# Patient Record
Sex: MALE | URBAN - METROPOLITAN AREA
[De-identification: names, ages, dates, MRNs, and addresses within clinical notes are randomized per-mention and may not be internally consistent; named-entity substitution may affect disease eponyms.]

---

## 2018-09-03 ENCOUNTER — INPATIENT (INPATIENT)
Facility: HOSPITAL | Age: 73
LOS: 0 days | Discharge: ANOTHER HOSPITAL | DRG: 282 | End: 2018-09-04
Attending: INTERNAL MEDICINE | Admitting: INTERNAL MEDICINE
Payer: MEDICARE

## 2018-09-03 ENCOUNTER — TRANSCRIPTION ENCOUNTER (OUTPATIENT)
Age: 73
End: 2018-09-03

## 2018-09-03 VITALS
OXYGEN SATURATION: 100 % | HEART RATE: 62 BPM | DIASTOLIC BLOOD PRESSURE: 76 MMHG | HEIGHT: 64 IN | RESPIRATION RATE: 17 BRPM | WEIGHT: 162.92 LBS | TEMPERATURE: 98 F | SYSTOLIC BLOOD PRESSURE: 139 MMHG

## 2018-09-03 DIAGNOSIS — R07.9 CHEST PAIN, UNSPECIFIED: ICD-10-CM

## 2018-09-03 DIAGNOSIS — Z29.9 ENCOUNTER FOR PROPHYLACTIC MEASURES, UNSPECIFIED: ICD-10-CM

## 2018-09-03 DIAGNOSIS — I21.4 NON-ST ELEVATION (NSTEMI) MYOCARDIAL INFARCTION: ICD-10-CM

## 2018-09-03 DIAGNOSIS — E78.5 HYPERLIPIDEMIA, UNSPECIFIED: ICD-10-CM

## 2018-09-03 DIAGNOSIS — I10 ESSENTIAL (PRIMARY) HYPERTENSION: ICD-10-CM

## 2018-09-03 LAB
ANION GAP SERPL CALC-SCNC: 8 MMOL/L — SIGNIFICANT CHANGE UP (ref 5–17)
APTT BLD: 31.6 SEC — SIGNIFICANT CHANGE UP (ref 27.5–37.4)
BASOPHILS # BLD AUTO: 0 K/UL — SIGNIFICANT CHANGE UP (ref 0–0.2)
BASOPHILS NFR BLD AUTO: 0.7 % — SIGNIFICANT CHANGE UP (ref 0–2)
BUN SERPL-MCNC: 23 MG/DL — HIGH (ref 7–18)
CALCIUM SERPL-MCNC: 8.6 MG/DL — SIGNIFICANT CHANGE UP (ref 8.4–10.5)
CHLORIDE SERPL-SCNC: 108 MMOL/L — SIGNIFICANT CHANGE UP (ref 96–108)
CK MB BLD-MCNC: 6.3 % — HIGH (ref 0–3.5)
CK MB CFR SERPL CALC: 44.4 NG/ML — HIGH (ref 0–3.6)
CK SERPL-CCNC: 701 U/L — HIGH (ref 35–232)
CO2 SERPL-SCNC: 25 MMOL/L — SIGNIFICANT CHANGE UP (ref 22–31)
CREAT SERPL-MCNC: 1.04 MG/DL — SIGNIFICANT CHANGE UP (ref 0.5–1.3)
EOSINOPHIL # BLD AUTO: 0.1 K/UL — SIGNIFICANT CHANGE UP (ref 0–0.5)
EOSINOPHIL NFR BLD AUTO: 1.8 % — SIGNIFICANT CHANGE UP (ref 0–6)
GLUCOSE SERPL-MCNC: 120 MG/DL — HIGH (ref 70–99)
HBA1C BLD-MCNC: 5.8 % — HIGH (ref 4–5.6)
HCT VFR BLD CALC: 51.5 % — HIGH (ref 39–50)
HGB BLD-MCNC: 16.4 G/DL — SIGNIFICANT CHANGE UP (ref 13–17)
INR BLD: 1.06 RATIO — SIGNIFICANT CHANGE UP (ref 0.88–1.16)
LYMPHOCYTES # BLD AUTO: 1.5 K/UL — SIGNIFICANT CHANGE UP (ref 1–3.3)
LYMPHOCYTES # BLD AUTO: 25.8 % — SIGNIFICANT CHANGE UP (ref 13–44)
MCHC RBC-ENTMCNC: 30.6 PG — SIGNIFICANT CHANGE UP (ref 27–34)
MCHC RBC-ENTMCNC: 31.9 GM/DL — LOW (ref 32–36)
MCV RBC AUTO: 95.9 FL — SIGNIFICANT CHANGE UP (ref 80–100)
MONOCYTES # BLD AUTO: 0.5 K/UL — SIGNIFICANT CHANGE UP (ref 0–0.9)
MONOCYTES NFR BLD AUTO: 9.1 % — SIGNIFICANT CHANGE UP (ref 2–14)
NEUTROPHILS # BLD AUTO: 3.7 K/UL — SIGNIFICANT CHANGE UP (ref 1.8–7.4)
NEUTROPHILS NFR BLD AUTO: 62.7 % — SIGNIFICANT CHANGE UP (ref 43–77)
PLATELET # BLD AUTO: 166 K/UL — SIGNIFICANT CHANGE UP (ref 150–400)
POTASSIUM SERPL-MCNC: 4.7 MMOL/L — SIGNIFICANT CHANGE UP (ref 3.5–5.3)
POTASSIUM SERPL-SCNC: 4.7 MMOL/L — SIGNIFICANT CHANGE UP (ref 3.5–5.3)
PROTHROM AB SERPL-ACNC: 11.6 SEC — SIGNIFICANT CHANGE UP (ref 9.8–12.7)
RBC # BLD: 5.37 M/UL — SIGNIFICANT CHANGE UP (ref 4.2–5.8)
RBC # FLD: 12.9 % — SIGNIFICANT CHANGE UP (ref 10.3–14.5)
SODIUM SERPL-SCNC: 141 MMOL/L — SIGNIFICANT CHANGE UP (ref 135–145)
TROPONIN I SERPL-MCNC: 11.4 NG/ML — HIGH (ref 0–0.04)
TROPONIN I SERPL-MCNC: <0.015 NG/ML — SIGNIFICANT CHANGE UP (ref 0–0.04)
TSH SERPL-MCNC: 0.77 UU/ML — SIGNIFICANT CHANGE UP (ref 0.34–4.82)
WBC # BLD: 6 K/UL — SIGNIFICANT CHANGE UP (ref 3.8–10.5)
WBC # FLD AUTO: 6 K/UL — SIGNIFICANT CHANGE UP (ref 3.8–10.5)

## 2018-09-03 PROCEDURE — 99285 EMERGENCY DEPT VISIT HI MDM: CPT

## 2018-09-03 PROCEDURE — 99223 1ST HOSP IP/OBS HIGH 75: CPT | Mod: GC

## 2018-09-03 PROCEDURE — 71046 X-RAY EXAM CHEST 2 VIEWS: CPT | Mod: 26

## 2018-09-03 RX ORDER — ENOXAPARIN SODIUM 100 MG/ML
40 INJECTION SUBCUTANEOUS DAILY
Refills: 0 | Status: DISCONTINUED | OUTPATIENT
Start: 2018-09-03 | End: 2018-09-03

## 2018-09-03 RX ORDER — ACETAMINOPHEN 500 MG
650 TABLET ORAL EVERY 6 HOURS
Refills: 0 | Status: DISCONTINUED | OUTPATIENT
Start: 2018-09-03 | End: 2018-09-04

## 2018-09-03 RX ORDER — METOPROLOL TARTRATE 50 MG
0.5 TABLET ORAL
Qty: 0 | Refills: 0 | DISCHARGE
Start: 2018-09-03

## 2018-09-03 RX ORDER — ASPIRIN/CALCIUM CARB/MAGNESIUM 324 MG
1 TABLET ORAL
Qty: 0 | Refills: 0 | DISCHARGE
Start: 2018-09-03

## 2018-09-03 RX ORDER — ACETAMINOPHEN 500 MG
2 TABLET ORAL
Qty: 0 | Refills: 0 | DISCHARGE
Start: 2018-09-03

## 2018-09-03 RX ORDER — ATORVASTATIN CALCIUM 80 MG/1
40 TABLET, FILM COATED ORAL AT BEDTIME
Refills: 0 | Status: DISCONTINUED | OUTPATIENT
Start: 2018-09-03 | End: 2018-09-03

## 2018-09-03 RX ORDER — METOPROLOL TARTRATE 50 MG
12.5 TABLET ORAL
Refills: 0 | Status: DISCONTINUED | OUTPATIENT
Start: 2018-09-03 | End: 2018-09-04

## 2018-09-03 RX ORDER — METOPROLOL TARTRATE 50 MG
25 TABLET ORAL
Refills: 0 | Status: DISCONTINUED | OUTPATIENT
Start: 2018-09-03 | End: 2018-09-03

## 2018-09-03 RX ORDER — METOPROLOL TARTRATE 50 MG
0 TABLET ORAL
Qty: 0 | Refills: 0 | DISCHARGE
Start: 2018-09-03

## 2018-09-03 RX ORDER — ASPIRIN/CALCIUM CARB/MAGNESIUM 324 MG
81 TABLET ORAL DAILY
Refills: 0 | Status: DISCONTINUED | OUTPATIENT
Start: 2018-09-03 | End: 2018-09-04

## 2018-09-03 RX ORDER — HEPARIN SODIUM 5000 [USP'U]/ML
6000 INJECTION INTRAVENOUS; SUBCUTANEOUS EVERY 6 HOURS
Refills: 0 | Status: DISCONTINUED | OUTPATIENT
Start: 2018-09-03 | End: 2018-09-03

## 2018-09-03 RX ORDER — ATORVASTATIN CALCIUM 80 MG/1
1 TABLET, FILM COATED ORAL
Qty: 0 | Refills: 0 | DISCHARGE
Start: 2018-09-03

## 2018-09-03 RX ORDER — HEPARIN SODIUM 5000 [USP'U]/ML
3000 INJECTION INTRAVENOUS; SUBCUTANEOUS EVERY 6 HOURS
Refills: 0 | Status: DISCONTINUED | OUTPATIENT
Start: 2018-09-03 | End: 2018-09-03

## 2018-09-03 RX ORDER — ASPIRIN/CALCIUM CARB/MAGNESIUM 324 MG
243 TABLET ORAL DAILY
Refills: 0 | Status: COMPLETED | OUTPATIENT
Start: 2018-09-03 | End: 2018-09-03

## 2018-09-03 RX ORDER — HEPARIN SODIUM 5000 [USP'U]/ML
INJECTION INTRAVENOUS; SUBCUTANEOUS
Qty: 25000 | Refills: 0 | Status: DISCONTINUED | OUTPATIENT
Start: 2018-09-03 | End: 2018-09-04

## 2018-09-03 RX ORDER — ASPIRIN/CALCIUM CARB/MAGNESIUM 324 MG
243 TABLET ORAL ONCE
Refills: 0 | Status: DISCONTINUED | OUTPATIENT
Start: 2018-09-03 | End: 2018-09-03

## 2018-09-03 RX ORDER — HEPARIN SODIUM 5000 [USP'U]/ML
0 INJECTION INTRAVENOUS; SUBCUTANEOUS
Qty: 0 | Refills: 0 | DISCHARGE
Start: 2018-09-03

## 2018-09-03 RX ORDER — SODIUM CHLORIDE 9 MG/ML
1000 INJECTION INTRAMUSCULAR; INTRAVENOUS; SUBCUTANEOUS ONCE
Refills: 0 | Status: COMPLETED | OUTPATIENT
Start: 2018-09-03 | End: 2018-09-03

## 2018-09-03 RX ORDER — ATORVASTATIN CALCIUM 80 MG/1
80 TABLET, FILM COATED ORAL AT BEDTIME
Refills: 0 | Status: DISCONTINUED | OUTPATIENT
Start: 2018-09-03 | End: 2018-09-04

## 2018-09-03 RX ADMIN — Medication 243 MILLIGRAM(S): at 10:51

## 2018-09-03 RX ADMIN — HEPARIN SODIUM 1300 UNIT(S)/HR: 5000 INJECTION INTRAVENOUS; SUBCUTANEOUS at 20:22

## 2018-09-03 RX ADMIN — SODIUM CHLORIDE 1000 MILLILITER(S): 9 INJECTION INTRAMUSCULAR; INTRAVENOUS; SUBCUTANEOUS at 10:50

## 2018-09-03 RX ADMIN — Medication 12.5 MILLIGRAM(S): at 19:59

## 2018-09-03 RX ADMIN — ATORVASTATIN CALCIUM 80 MILLIGRAM(S): 80 TABLET, FILM COATED ORAL at 22:42

## 2018-09-03 RX ADMIN — Medication 25 MILLIGRAM(S): at 17:34

## 2018-09-03 NOTE — ED PROVIDER NOTE - PROGRESS NOTE DETAILS
Patient admitted to hospitalist service. Patient's pain remains present however it has lessened. Patient's breathing and diaphoresis has improved. Initial cardiac workup negative. Will recommend admission.

## 2018-09-03 NOTE — H&P ADULT - ATTENDING COMMENTS
Patient seen and examined; Agree with PGY1 MAR A/P above with editing as needed. d/w LORI White    72 yr old male form home with pmh of htn & hld who presented in emergency with chest pain x 3 hours. Pt states that his chest pain started suddenly while he was sitting, 6/10 in intensity, dull, radiating to his left arm and back on left side.    ROS:   FH: No significant Hx DM, HTN, CAD  SH: Non smoker; I glass wine daily; Lives with wife    Vital Signs Last 24 Hrs  T(C): 36.8 (03 Sep 2018 16:09), Max: 36.9 (03 Sep 2018 10:13)  T(F): 98.2 (03 Sep 2018 16:09), Max: 98.5 (03 Sep 2018 10:13)  HR: 74 (03 Sep 2018 16:09) (62 - 74)  BP: 128/77 (03 Sep 2018 16:09) (128/77 - 139/76)  RR: 18 (03 Sep 2018 16:09) (17 - 18)  SpO2: 99% (03 Sep 2018 16:09) (99% - 100%)    P/E:  Neuro: AAO x 3; No focal deficits  CVS: S1S2 present, Regular  Resp: BLAE+, No wheeze or Rhonchi  GI: Soft, BS+, NT, ND  Extr; No edema or calf tenderness B/L LE; Good ROM at left shoulder    Labs:                        16.4   6.0   )-----------( 166      ( 03 Sep 2018 10:45 )             51.5   09-03    141  |  108  |  23<H>  ----------------------------<  120<H>  4.7   |  25  |  1.04    Ca    8.6      03 Sep 2018 10:45    CE x1 set negative    Plan:  Tele x 24 hrs; Serial Cardiac enzymes x3; 2D Echo  ASA, Statin, low dose beta blocker until ACS ruled out  Cardio eval d/w Dr. Guadarrama; will get Nuclear Exercise Stress in AM if Echo WNL  Patient and wife advised no coffee, tea or Soda from now until morning  DVT PPX  Continue home meds      Discussed with Patient and wife findings and plan of care as outlined above  Discussed with RN ED Hold and PGY1 Dr. Bran Patient seen and examined; Agree with PGY1 MAR A/P above with editing as needed. d/w LORI White    72 yr old male form home with pmh of htn & hld who presented in emergency with chest pain x 3 hours. Pt states that his chest pain started suddenly while he was sitting, 6/10 in intensity, dull, radiating to his left arm and back on left side.    ROS:   FH: No significant Hx DM, HTN, CAD  SH: Non smoker; I glass wine daily; Lives with wife    Vital Signs Last 24 Hrs  T(C): 36.8 (03 Sep 2018 16:09), Max: 36.9 (03 Sep 2018 10:13)  T(F): 98.2 (03 Sep 2018 16:09), Max: 98.5 (03 Sep 2018 10:13)  HR: 74 (03 Sep 2018 16:09) (62 - 74)  BP: 128/77 (03 Sep 2018 16:09) (128/77 - 139/76)  RR: 18 (03 Sep 2018 16:09) (17 - 18)  SpO2: 99% (03 Sep 2018 16:09) (99% - 100%)    P/E:  Neuro: AAO x 3; No focal deficits  CVS: S1S2 present, Regular  Resp: BLAE+, No wheeze or Rhonchi  GI: Soft, BS+, NT, ND  Extr; No edema or calf tenderness B/L LE; Good ROM at left shoulder    Labs:                        16.4   6.0   )-----------( 166      ( 03 Sep 2018 10:45 )             51.5   09-03    141  |  108  |  23<H>  ----------------------------<  120<H>  4.7   |  25  |  1.04    Ca    8.6      03 Sep 2018 10:45    CE x1 set negative    Plan:  Tele x 24 hrs; Serial Cardiac enzymes x3; 2D Echo  ASA, Statin, low dose beta blocker until ACS ruled out  Cardio eval d/w Dr. Guadarrama; will get Nuclear Exercise Stress in AM if Echo WNL  Patient and wife advised no coffee, tea or Soda from now until morning  DVT PPX  Continue home meds  Add on TSH and HgbA1c      Discussed with Patient and wife findings and plan of care as outlined above  Discussed with RN ED Hold and PGY1 Dr. Bran Patient seen and examined; Agree with PGY1 MAR A/P above with editing as needed. d/w LORI White    72 yr old male form home with pmh of htn & hld who presented in emergency with chest pain x 3 hours. Pt states that his chest pain started suddenly while he was sitting, 6/10 in intensity, dull, radiating to his left arm and back on left side.    ROS:   FH: No significant Hx DM, HTN, CAD  SH: Non smoker; I glass wine daily; Lives with wife    Vital Signs Last 24 Hrs  T(C): 36.8 (03 Sep 2018 16:09), Max: 36.9 (03 Sep 2018 10:13)  T(F): 98.2 (03 Sep 2018 16:09), Max: 98.5 (03 Sep 2018 10:13)  HR: 74 (03 Sep 2018 16:09) (62 - 74)  BP: 128/77 (03 Sep 2018 16:09) (128/77 - 139/76)  RR: 18 (03 Sep 2018 16:09) (17 - 18)  SpO2: 99% (03 Sep 2018 16:09) (99% - 100%)    P/E:  Neuro: AAO x 3; No focal deficits  CVS: S1S2 present, Regular  Resp: BLAE+, No wheeze or Rhonchi  GI: Soft, BS+, NT, ND  Extr; No edema or calf tenderness B/L LE; Good ROM at left shoulder    Labs:                        16.4   6.0   )-----------( 166      ( 03 Sep 2018 10:45 )             51.5   09-03    141  |  108  |  23<H>  ----------------------------<  120<H>  4.7   |  25  |  1.04    Ca    8.6      03 Sep 2018 10:45    CE x1 set negative    Plan:  Tele x 24 hrs; Serial Cardiac enzymes x3; 2D Echo  ASA, Statin, low dose beta blocker until ACS ruled out  Cardio eval d/w Dr. Guadarrama; will get Nuclear Exercise Stress in AM if Echo WNL  Patient and wife advised no coffee, tea or Soda from now until morning  DVT PPX  Continue home meds  Add on TSH and HgbA1c      Discussed with Patient and wife findings and plan of care as outlined above  Discussed with RN ED Hold and PGY1 Dr. White

## 2018-09-03 NOTE — ED PROVIDER NOTE - MEDICAL DECISION MAKING DETAILS
Patient with chest pain for x1 hr duration. Will obtain EKG, cardiac workup, Aspirin, fluids, chest X-ray and reassess.

## 2018-09-03 NOTE — DISCHARGE NOTE ADULT - CARE PROVIDER_API CALL
Erasmo Guadarrama), Cardiology  [address] [id number]  Phone: [phone]  Fax: [phone] Erasmo Guadarrama), Cardiology  [address] [place] [address]  Phone: [phone]  Fax: [phone]    Boy Astudillo), Cardiovascular Disease; Interventional Cardiology  [address] [place] [address]  Phone: [phone]  Fax: [phone]

## 2018-09-03 NOTE — H&P ADULT - PROBLEM SELECTOR PLAN 1
Atypical Chest pain with No associated diaphoresis, palpitations, sob or syncope  Cardiac enzyme set 1 negative  Cardiac enzyme set 2 positive at 11 & EKG done with 2nd set of Trop show new T wave inversion in lead 3   -NSTEMI  -aspirin, statin & beta blocker  - consulted, pt started on heparin drip   -T3 at 23:59 on 09/03/18  -Pt will be transferred to [place] with receiving physician Dr. Astudillo for cardiac cath  -Plan discussed with Dr. Guadarrama

## 2018-09-03 NOTE — H&P ADULT - PROBLEM SELECTOR PLAN 4
will continue with Atorvastatin Improve score of 2, pt on full dose heparin drip so no need for lovenox

## 2018-09-03 NOTE — DISCHARGE NOTE ADULT - MEDICATION SUMMARY - MEDICATIONS TO TAKE
I will START or STAY ON the medications listed below when I get home from the hospital:    acetaminophen 325 mg oral tablet  -- 2 tab(s) by mouth every 6 hours, As needed, Moderate Pain (4 - 6)  -- Indication: For pain    aspirin 81 mg oral tablet, chewable  -- 1 tab(s) by mouth once a day  -- Indication: For prophylactic    enalapril 10 mg oral tablet  -- 1 tab(s) by mouth once a day  -- Indication: For HTN (hypertension)    heparin 100 units/mL-D5% intravenous solution  --  intravenous   -- Indication: For NSTEMI (non-ST elevated myocardial infarction)    atorvastatin 80 mg oral tablet  -- 1 tab(s) by mouth once a day (at bedtime)  -- Indication: For NSTEMI (non-ST elevated myocardial infarction)    lovastatin 20 mg oral tablet  -- 1 tab(s) by mouth once a day (at bedtime)  -- Indication: For Hyperlipidemia    metoprolol  -- Indication: For HTN (hypertension)

## 2018-09-03 NOTE — PATIENT PROFILE ADULT. - VISION (WITH CORRECTIVE LENSES IF THE PATIENT USUALLY WEARS THEM):
uses glasses for reading/Normal vision: sees adequately in most situations; can see medication labels, newsprint

## 2018-09-03 NOTE — ED PROVIDER NOTE - OBJECTIVE STATEMENT
73 y/o M patient with # [id number]  73 y/o M patient with a significant PMHx of HTN, HLD and no significant PSHx presents to the ED with a x1 hour history left chest pain which radiates to the left arm. Patient also reports chest pain is associated with nausea, diaphoresis, and shortness of breath. Patient denies emesis, abdominal pain, change in bladder or bowel habits, syncope, light headiness or any other complaints. Patient states she noticed some left lower extremity swelling. Patient denies fall, history of cardiac illness and similar events. NKDA.

## 2018-09-03 NOTE — ED ADULT NURSE NOTE - ED STAT RN HANDOFF DETAILS 2
Pt is transferred to [place] and was picked up by ambulance. pt is stable, vitals WNL. No chest pains, heparin drip @ 900 units/hr in progress.

## 2018-09-03 NOTE — DISCHARGE NOTE ADULT - MEDICATION SUMMARY - MEDICATIONS TO STOP TAKING
I will STOP taking the medications listed below when I get home from the hospital:    Aspir 81    lovastatin 20 mg oral tablet  -- 1 tab(s) by mouth once a day (at bedtime)

## 2018-09-03 NOTE — H&P ADULT - HISTORY OF PRESENT ILLNESS
Pt is a 72 yr old male form home with pmh of htn & hld who presented in emergency with chest pain x 3 hours. Pt states that his chest pain started suddenly while he was sitting, 6/10 in intensity, dull, radiating to his left arm and back on left side. Pt denied any associated palpitations sob, diaphoresis, dizziness associated with chest pain. Pt denied any previous episode of similar pain. Pt is able to ambulate with any shortness of breath at baseline Pt is a 72 yr old male form home with pmh of htn & hld who presented in emergency with chest pain x 3 hours. Pt states that his chest pain started suddenly while he was sitting, 6/10 in intensity, dull, radiating to his left arm and back on left side. Pt denied any associated palpitations sob, diaphoresis, dizziness associated with chest pain. Pt denied any recent exertional work or lifting heavy weights. Pt denied any previous episode of similar pain. Pt is able to ambulate with any shortness of breath at baseline. Pt denies any history of smoking or drug intake, but does aknowlegde alcohol intake (white wine) 1 glass per day.

## 2018-09-03 NOTE — ED ADULT NURSE REASSESSMENT NOTE - NS ED NURSE REASSESS COMMENT FT1
received pt from nurse stephanie butler pt awake alert oriented x 3 not in dsitress,with saline lock intact no redness no swelling noted,hooked to cardiac monitor on heparin drip.

## 2018-09-03 NOTE — DISCHARGE NOTE ADULT - PLAN OF CARE
will continue with enalapril & metoprolol with parameters will continue with atorvastatin 80mg T1 0.015  T2 11.4  T3   EKG 1 NSR @ 70, no st segment & t wave changes & RBBB, EKG with trop 2 show t wave inversion in lead 3 & RBBB  -Transferring Pt for cardiac catherization to [place] with receiving physician Dr. Astudillo Cardiac Catherization Patient presented with left sided chest pain with radiation to left shoulder and arm. Initial troponin was negative but marissa to 11 on repeat after 6 hrs. No significant EKG changes on repeat.   Patient on Aspirin; As d/w Cardio placed on heparin drip and given high dose Statin Lipitor 80 mg. Also continued on beta blocker and ARB.   EKG 1 NSR @ 70, no st segment & t wave changes & RBBB, EKG with trop 2 show t wave inversion in lead 3 & RBBB  -Transferring Patient for Cardiac Catheterization to [place] with receiving physician Dr. Astudillo Evaluate for Coronary obstruction and treat Control BP less than 130/80 mm Hg Placed on with atorvastatin 80mg

## 2018-09-03 NOTE — CONSULT NOTE ADULT - SUBJECTIVE AND OBJECTIVE BOX
CHIEF COMPLAINT:Chest Pain    HPI:- 72 yr old male from home with pmh of HTN,HLD who presented in emergency with chest pain x 3 hours. Pt states that his chest pain started suddenly while he was sitting, 6/10 in intensity, dull, radiating to his left arm and back on left side. Pt denied any associated palpitations sob, diaphoresis, dizziness associated with chest pain. Pt denied any recent exertional work or lifting heavy weights. Pt denied any previous episode of similar pain. Pt is able to ambulate with any shortness of breath at baseline. Pt denies any history of smoking or drug intake, but does aknowlegde alcohol intake (white wine) 1 glass per day. Noted elevated troponins-remains chest pain free with no ECG evidence for STEMI.    PAST MEDICAL & SURGICAL HISTORY:  Hyperlipidemia  HTN (hypertension)  No significant past surgical history      MEDICATIONS  (STANDING):  aspirin  chewable 81 milliGRAM(s) Oral daily  atorvastatin 80 milliGRAM(s) Oral at bedtime  enalapril 10 milliGRAM(s) Oral daily  heparin  Infusion.  Unit(s)/Hr (13 mL/Hr) IV Continuous <Continuous>  metoprolol tartrate 12.5 milliGRAM(s) Oral two times a day    MEDICATIONS  (PRN):  acetaminophen   Tablet. 650 milliGRAM(s) Oral every 6 hours PRN Moderate Pain (4 - 6)      FAMILY HISTORY:  No pertinent family history in first degree relatives  No family history of premature coronary artery disease or sudden cardiac death    SOCIAL HISTORY:  Smoking-Non Smoker  Alcohol-Social  Ilicit Drug use-Denies    REVIEW OF SYSTEMS:  Constitutional: [ ] fever, [ ]weight loss, [ ]fatigue Activity [ ] Bedbound,[ ] Ambulates [ ] Unassisted[ ] Cane/Walker [ ] Assistence.  Eyes: [ ] visual changes  Respiratory: [ ]shortness of breath;  [ ] cough, [ ]wheezing, [ ]chills, [ ]hemoptysis  Cardiovascular: [x ] chest pain, [ ]palpitations, [ ]dizziness,  [ ]leg swelling[ ]orthopnea [ ]PND  Gastrointestinal: [ ] abdominal pain, [ ]nausea, [ ]vomiting,  [ ]diarrhea,[ ]constipation  Genitourinary: [ ] dysuria, [ ] hematuria  Neurologic: [ ] headaches [ ] tremors[ ] weakness  Skin: [ ] itching, [ ]burning, [ ] rashes  Endocrine: [ ] heat or cold intolerance  Musculoskeletal: [ ] joint pain or swelling; [ ] muscle, back, or extremity pain  Psychiatric: [ ] depression, [ ]anxiety, [ ]mood swings, or [ ]difficulty sleeping  Hematologic: [ ] easy bruising, [ ] bleeding gums       [ x] All others negative	  [ ] Unable to obtain    Vital Signs Last 24 Hrs  T(C): 36.8 (03 Sep 2018 16:09), Max: 36.9 (03 Sep 2018 10:13)  T(F): 98.2 (03 Sep 2018 16:09), Max: 98.5 (03 Sep 2018 10:13)  HR: 74 (03 Sep 2018 16:09) (62 - 74)  BP: 128/77 (03 Sep 2018 16:09) (128/77 - 139/76)  RR: 18 (03 Sep 2018 16:09) (17 - 18)  SpO2: 99% (03 Sep 2018 16:09) (99% - 100%)    PHYSICAL EXAM:  General: No acute distress BMI-28  HEENT: EOMI, PERRL[ ] Icteric  Neck: Supple, No JVD  Lungs: Equal air entry bilaterally; [ ] Rales [ ] Rhonchi [ ] Wheezing  Heart: Regular rate and rhythm;[x ] Murmurs-  2 /6 [x ] Systolic [ ] Diastolic [ ] Radiation,No rubs, or gallops  Abdomen: Nontender, bowel sounds present  Extremities: No clubbing, cyanosis, or edema[ ] Calf tenderness  Nervous system:  Alert & Oriented X3, no focal deficits  Psychiatric: Normal affect  Skin: No rashes or lesions      LABS:             16.4   6.0   )-----------( 166      ( 03 Sep 2018 10:45 )             51.5   09-03    141  |  108  |  23<H>  ----------------------------<  120<H>  4.7   |  25  |  1.04    CARDIAC MARKERS ( 03 Sep 2018 17:55 )  11.400 ng/mL / x     / 701 U/L / x     / 44.4 ng/mL  CARDIAC MARKERS ( 03 Sep 2018 10:45 )  <0.015 ng/mL / x     / x     / x     / x            09-03 [id number] 5.8      RADIOLOGY:-XR CHEST PA LAT 2V                     Impression: No radiographic evidence for acutecardiopulmonary disease.    ECG [my interpretation]:Sinus Rhythm at 70bpm,normal intervals,no acute ST T wave abnormalities    TELEMETRY:Sinus rhythm no arrhythmias

## 2018-09-03 NOTE — DISCHARGE NOTE ADULT - HOSPITAL COURSE
HPI:  Pt is a 72 yr old male form home with pmh of htn & hld who presented in emergency with chest pain x 3 hours. Pt states that his chest pain started suddenly while he was sitting, 6/10 in intensity, dull, radiating to his left arm and back on left side. Pt denied any associated palpitations sob, diaphoresis, dizziness associated with chest pain. Pt denied any recent exertional work or lifting heavy weights. Pt denied any previous episode of similar pain. Pt is able to ambulate with any shortness of breath at baseline. Pt denies any history of smoking or drug intake, but does aknowlegde alcohol intake (white wine) 1 glass per day. (03 Sep 2018 16:53)  · Assessment	  Pt is a 72 yr old male with pmh of htn & hld who came in to emergency with chest pain x 1 hours.   In ED pts vitals were stable, first set of cardiac enzymes was negative with EKG showing NSR 70BMP, no ST segment & T wave changes. EKG does show RBBB with no comparison available       Problem/Plan - 1:  ·  Problem: NSTEMI (non-ST elevated myocardial infarction).  Plan: Atypical Chest pain with No associated diaphoresis, palpitations, sob or syncope  Cardiac enzyme set 1 negative  Cardiac enzyme set 2 positive at 11 & EKG done with 2nd set of Trop show new T wave inversion in lead 3   -NSTEMI  -aspirin, statin & beta blocker  - consulted, pt started on heparin drip   -T3 at 23:59 on 09/03/18  -Pt will be transferred to [place] with receiving physician Dr. Astudillo for cardiac cath  -Plan discussed with Dr. Gaudarrama.      Problem/Plan - 2:  ·  Problem: Chest pain, unspecified type.  Plan: Atypical Chest pain  No associated diaphoresis, palpitations, sob or syncope  Cardiac enzyme set 1 negative  EKG NSR with 75 bpm, no st segment & t wave changes, rbbb   Plan:  Follow up.      Problem/Plan - 3:  ·  Problem: HTN (hypertension).  Plan: will continue enalapril & metorpolol.      Problem/Plan - 4:  ·  Problem: Hyperlipidemia.  Plan: will continue with Atorvastatin.

## 2018-09-03 NOTE — CONSULT NOTE ADULT - ATTENDING COMMENTS
Thank you for the courtesy of the consultation,I would be available for any further discussion if needed.  Erasmo Guadarrama MD,FACC.  [address] [place], [place] [id number] [phone]

## 2018-09-03 NOTE — DISCHARGE NOTE ADULT - PATIENT PORTAL LINK FT
You can access the [facility] Portal, offered by [facility], by registering with the following website: [email or web address]

## 2018-09-03 NOTE — DISCHARGE NOTE ADULT - CARE PROVIDERS DIRECT ADDRESSES
,DirectAddress_Unknown ,DirectAddress_Unknown,[email or web address]

## 2018-09-03 NOTE — H&P ADULT - PROBLEM SELECTOR PLAN 2
Atypical Chest pain  No associated diaphoresis, palpitations, sob or syncope  Cardiac enzyme set 1 negative  EKG NSR with 75 bpm, no st segment & t wave changes, rbbb   Plan:  Follow up will continue enalapril & metorpolol

## 2018-09-03 NOTE — CONSULT NOTE ADULT - PROBLEM SELECTOR RECOMMENDATION 9
Chest pain-ACS-NSTEMI-remains chest pain free-Elevated troponins,  No evidence for Heart Failure  Continue Heparin gtt,BBlockers Aspirin and statins.  For cardiac cath in [facility] Dr Astudillo.

## 2018-09-03 NOTE — DISCHARGE NOTE ADULT - CARE PLAN
Principal Discharge DX:	NSTEMI (non-ST elevated myocardial infarction)  Goal:	Cardiac Catherization  Assessment and plan of treatment:	T1 0.015  T2 11.4  T3   EKG 1 NSR @ 70, no st segment & t wave changes & RBBB, EKG with trop 2 show t wave inversion in lead 3 & RBBB  -Transferring Pt for cardiac catherization to [place] with receiving physician Dr. Astudillo  Secondary Diagnosis:	HTN (hypertension)  Assessment and plan of treatment:	will continue with enalapril & metoprolol with parameters  Secondary Diagnosis:	Hyperlipidemia  Assessment and plan of treatment:	will continue with atorvastatin 80mg Principal Discharge DX:	NSTEMI (non-ST elevated myocardial infarction)  Goal:	Evaluate for Coronary obstruction and treat  Assessment and plan of treatment:	Patient presented with left sided chest pain with radiation to left shoulder and arm. Initial troponin was negative but marissa to 11 on repeat after 6 hrs. No significant EKG changes on repeat.   Patient on Aspirin; As d/w Cardio placed on heparin drip and given high dose Statin Lipitor 80 mg. Also continued on beta blocker and ARB.   EKG 1 NSR @ 70, no st segment & t wave changes & RBBB, EKG with trop 2 show t wave inversion in lead 3 & RBBB  -Transferring Patient for Cardiac Catheterization to [place] with receiving physician Dr. Astudillo  Secondary Diagnosis:	HTN (hypertension)  Goal:	Control BP less than 130/80 mm Hg  Assessment and plan of treatment:	will continue with enalapril & metoprolol with parameters  Secondary Diagnosis:	Hyperlipidemia  Assessment and plan of treatment:	Placed on with atorvastatin 80mg

## 2018-09-04 ENCOUNTER — INPATIENT (INPATIENT)
Facility: HOSPITAL | Age: 73
LOS: 0 days | Discharge: HOME | DRG: 251 | End: 2018-09-05
Attending: INTERNAL MEDICINE | Admitting: INTERNAL MEDICINE
Payer: MEDICARE

## 2018-09-04 ENCOUNTER — TRANSCRIPTION ENCOUNTER (OUTPATIENT)
Age: 73
End: 2018-09-04

## 2018-09-04 ENCOUNTER — INBOUND DOCUMENT (OUTPATIENT)
Age: 73
End: 2018-09-04

## 2018-09-04 VITALS
TEMPERATURE: 98 F | OXYGEN SATURATION: 98 % | HEART RATE: 62 BPM | SYSTOLIC BLOOD PRESSURE: 134 MMHG | DIASTOLIC BLOOD PRESSURE: 65 MMHG | RESPIRATION RATE: 17 BRPM

## 2018-09-04 VITALS
RESPIRATION RATE: 17 BRPM | HEART RATE: 68 BPM | TEMPERATURE: 98 F | SYSTOLIC BLOOD PRESSURE: 135 MMHG | OXYGEN SATURATION: 96 % | DIASTOLIC BLOOD PRESSURE: 79 MMHG

## 2018-09-04 DIAGNOSIS — I10 ESSENTIAL (PRIMARY) HYPERTENSION: ICD-10-CM

## 2018-09-04 DIAGNOSIS — E78.5 HYPERLIPIDEMIA, UNSPECIFIED: ICD-10-CM

## 2018-09-04 DIAGNOSIS — I21.4 NON-ST ELEVATION (NSTEMI) MYOCARDIAL INFARCTION: ICD-10-CM

## 2018-09-04 DIAGNOSIS — Z98.890 OTHER SPECIFIED POSTPROCEDURAL STATES: Chronic | ICD-10-CM

## 2018-09-04 LAB
24R-OH-CALCIDIOL SERPL-MCNC: 27 NG/ML — LOW (ref 30–80)
ALBUMIN SERPL ELPH-MCNC: 3.1 G/DL — LOW (ref 3.5–5)
ALP SERPL-CCNC: 51 U/L — SIGNIFICANT CHANGE UP (ref 40–120)
ALT FLD-CCNC: 40 U/L DA — SIGNIFICANT CHANGE UP (ref 10–60)
ANION GAP SERPL CALC-SCNC: 10 MMOL/L — SIGNIFICANT CHANGE UP (ref 5–17)
APTT BLD: 103.9 SEC — HIGH (ref 27.5–37.4)
APTT BLD: 130.8 SEC — CRITICAL HIGH (ref 27.5–37.4)
AST SERPL-CCNC: 247 U/L — HIGH (ref 10–40)
BASOPHILS # BLD AUTO: 0 K/UL — SIGNIFICANT CHANGE UP (ref 0–0.2)
BASOPHILS NFR BLD AUTO: 0.4 % — SIGNIFICANT CHANGE UP (ref 0–2)
BILIRUB SERPL-MCNC: 0.8 MG/DL — SIGNIFICANT CHANGE UP (ref 0.2–1.2)
BUN SERPL-MCNC: 12 MG/DL — SIGNIFICANT CHANGE UP (ref 7–18)
CALCIUM SERPL-MCNC: 8.3 MG/DL — LOW (ref 8.4–10.5)
CHLORIDE SERPL-SCNC: 106 MMOL/L — SIGNIFICANT CHANGE UP (ref 96–108)
CHOLEST SERPL-MCNC: 159 MG/DL — SIGNIFICANT CHANGE UP (ref 10–199)
CK MB BLD-MCNC: 7.1 % — HIGH (ref 0–3.5)
CK MB BLD-MCNC: 7.6 % — HIGH (ref 0–3.5)
CK MB CFR SERPL CALC: 111.9 NG/ML — HIGH (ref 0–3.6)
CK MB CFR SERPL CALC: 116.4 NG/ML — HIGH (ref 0–3.6)
CK SERPL-CCNC: 1471 U/L — HIGH (ref 35–232)
CK SERPL-CCNC: 1650 U/L — HIGH (ref 35–232)
CO2 SERPL-SCNC: 24 MMOL/L — SIGNIFICANT CHANGE UP (ref 22–31)
CREAT SERPL-MCNC: 0.65 MG/DL — SIGNIFICANT CHANGE UP (ref 0.5–1.3)
EOSINOPHIL # BLD AUTO: 0 K/UL — SIGNIFICANT CHANGE UP (ref 0–0.5)
EOSINOPHIL NFR BLD AUTO: 0.3 % — SIGNIFICANT CHANGE UP (ref 0–6)
FOLATE SERPL-MCNC: 14.6 NG/ML — SIGNIFICANT CHANGE UP
GLUCOSE SERPL-MCNC: 112 MG/DL — HIGH (ref 70–99)
HCT VFR BLD CALC: 51 % — HIGH (ref 39–50)
HCT VFR BLD CALC: 52 % — HIGH (ref 39–50)
HDLC SERPL-MCNC: 46 MG/DL — SIGNIFICANT CHANGE UP
HGB BLD-MCNC: 16.5 G/DL — SIGNIFICANT CHANGE UP (ref 13–17)
HGB BLD-MCNC: 17.2 G/DL — HIGH (ref 13–17)
INR BLD: 1.09 RATIO — SIGNIFICANT CHANGE UP (ref 0.88–1.16)
INR BLD: 1.11 RATIO — SIGNIFICANT CHANGE UP (ref 0.88–1.16)
LIPID PNL WITH DIRECT LDL SERPL: 92 MG/DL — SIGNIFICANT CHANGE UP
LYMPHOCYTES # BLD AUTO: 1.3 K/UL — SIGNIFICANT CHANGE UP (ref 1–3.3)
LYMPHOCYTES # BLD AUTO: 11.9 % — LOW (ref 13–44)
MAGNESIUM SERPL-MCNC: 2 MG/DL — SIGNIFICANT CHANGE UP (ref 1.6–2.6)
MCHC RBC-ENTMCNC: 30.6 PG — SIGNIFICANT CHANGE UP (ref 27–34)
MCHC RBC-ENTMCNC: 31.2 PG — SIGNIFICANT CHANGE UP (ref 27–34)
MCHC RBC-ENTMCNC: 32.4 GM/DL — SIGNIFICANT CHANGE UP (ref 32–36)
MCHC RBC-ENTMCNC: 33 GM/DL — SIGNIFICANT CHANGE UP (ref 32–36)
MCV RBC AUTO: 94.6 FL — SIGNIFICANT CHANGE UP (ref 80–100)
MCV RBC AUTO: 94.7 FL — SIGNIFICANT CHANGE UP (ref 80–100)
MONOCYTES # BLD AUTO: 0.8 K/UL — SIGNIFICANT CHANGE UP (ref 0–0.9)
MONOCYTES NFR BLD AUTO: 7.7 % — SIGNIFICANT CHANGE UP (ref 2–14)
NEUTROPHILS # BLD AUTO: 8.6 K/UL — HIGH (ref 1.8–7.4)
NEUTROPHILS NFR BLD AUTO: 79.8 % — HIGH (ref 43–77)
PHOSPHATE SERPL-MCNC: 2.3 MG/DL — LOW (ref 2.5–4.5)
PLATELET # BLD AUTO: 142 K/UL — LOW (ref 150–400)
PLATELET # BLD AUTO: 151 K/UL — SIGNIFICANT CHANGE UP (ref 150–400)
POTASSIUM SERPL-MCNC: 4.3 MMOL/L — SIGNIFICANT CHANGE UP (ref 3.5–5.3)
POTASSIUM SERPL-SCNC: 4.3 MMOL/L — SIGNIFICANT CHANGE UP (ref 3.5–5.3)
PROT SERPL-MCNC: 6.6 G/DL — SIGNIFICANT CHANGE UP (ref 6–8.3)
PROTHROM AB SERPL-ACNC: 11.9 SEC — SIGNIFICANT CHANGE UP (ref 9.8–12.7)
PROTHROM AB SERPL-ACNC: 12.1 SEC — SIGNIFICANT CHANGE UP (ref 9.8–12.7)
RBC # BLD: 5.38 M/UL — SIGNIFICANT CHANGE UP (ref 4.2–5.8)
RBC # BLD: 5.5 M/UL — SIGNIFICANT CHANGE UP (ref 4.2–5.8)
RBC # FLD: 12.7 % — SIGNIFICANT CHANGE UP (ref 10.3–14.5)
RBC # FLD: 13.1 % — SIGNIFICANT CHANGE UP (ref 10.3–14.5)
SODIUM SERPL-SCNC: 140 MMOL/L — SIGNIFICANT CHANGE UP (ref 135–145)
TOTAL CHOLESTEROL/HDL RATIO MEASUREMENT: 3.5 RATIO — SIGNIFICANT CHANGE UP (ref 3.4–9.6)
TRIGL SERPL-MCNC: 105 MG/DL — SIGNIFICANT CHANGE UP (ref 10–149)
TROPONIN I SERPL-MCNC: 29.2 NG/ML — HIGH (ref 0–0.04)
TROPONIN I SERPL-MCNC: 36.6 NG/ML — HIGH (ref 0–0.04)
VIT B12 SERPL-MCNC: 285 PG/ML — SIGNIFICANT CHANGE UP (ref 232–1245)
WBC # BLD: 10.8 K/UL — HIGH (ref 3.8–10.5)
WBC # BLD: 11.5 K/UL — HIGH (ref 3.8–10.5)
WBC # FLD AUTO: 10.8 K/UL — HIGH (ref 3.8–10.5)
WBC # FLD AUTO: 11.5 K/UL — HIGH (ref 3.8–10.5)

## 2018-09-04 PROCEDURE — 93454 CORONARY ARTERY ANGIO S&I: CPT | Mod: 26,59

## 2018-09-04 PROCEDURE — 92941 PRQ TRLML REVSC TOT OCCL AMI: CPT | Mod: RC

## 2018-09-04 PROCEDURE — 99239 HOSP IP/OBS DSCHRG MGMT >30: CPT

## 2018-09-04 PROCEDURE — 99152 MOD SED SAME PHYS/QHP 5/>YRS: CPT

## 2018-09-04 PROCEDURE — 93010 ELECTROCARDIOGRAM REPORT: CPT

## 2018-09-04 RX ORDER — ATORVASTATIN CALCIUM 80 MG/1
80 TABLET, FILM COATED ORAL AT BEDTIME
Refills: 0 | Status: DISCONTINUED | OUTPATIENT
Start: 2018-09-04 | End: 2018-09-05

## 2018-09-04 RX ORDER — SODIUM CHLORIDE 9 MG/ML
1000 INJECTION, SOLUTION INTRAVENOUS
Refills: 0 | Status: DISCONTINUED | OUTPATIENT
Start: 2018-09-04 | End: 2018-09-04

## 2018-09-04 RX ORDER — ACETAMINOPHEN 500 MG
650 TABLET ORAL ONCE
Refills: 0 | Status: COMPLETED | OUTPATIENT
Start: 2018-09-04 | End: 2018-09-04

## 2018-09-04 RX ORDER — METOPROLOL TARTRATE 50 MG
12.5 TABLET ORAL
Refills: 0 | Status: DISCONTINUED | OUTPATIENT
Start: 2018-09-04 | End: 2018-09-05

## 2018-09-04 RX ORDER — CLOPIDOGREL BISULFATE 75 MG/1
1 TABLET, FILM COATED ORAL
Qty: 90 | Refills: 3
Start: 2018-09-04 | End: 2019-08-29

## 2018-09-04 RX ORDER — SODIUM,POTASSIUM PHOSPHATES 278-250MG
1 POWDER IN PACKET (EA) ORAL
Refills: 0 | Status: DISCONTINUED | OUTPATIENT
Start: 2018-09-04 | End: 2018-09-04

## 2018-09-04 RX ORDER — CLOPIDOGREL BISULFATE 75 MG/1
75 TABLET, FILM COATED ORAL DAILY
Refills: 0 | Status: DISCONTINUED | OUTPATIENT
Start: 2018-09-04 | End: 2018-09-05

## 2018-09-04 RX ORDER — NITROGLYCERIN 6.5 MG
0.4 CAPSULE, EXTENDED RELEASE ORAL
Refills: 0 | Status: DISCONTINUED | OUTPATIENT
Start: 2018-09-04 | End: 2018-09-04

## 2018-09-04 RX ORDER — ASPIRIN/CALCIUM CARB/MAGNESIUM 324 MG
81 TABLET ORAL DAILY
Refills: 0 | Status: DISCONTINUED | OUTPATIENT
Start: 2018-09-04 | End: 2018-09-05

## 2018-09-04 RX ADMIN — HEPARIN SODIUM 0 UNIT(S)/HR: 5000 INJECTION INTRAVENOUS; SUBCUTANEOUS at 07:19

## 2018-09-04 RX ADMIN — HEPARIN SODIUM 1100 UNIT(S)/HR: 5000 INJECTION INTRAVENOUS; SUBCUTANEOUS at 01:30

## 2018-09-04 RX ADMIN — Medication 81 MILLIGRAM(S): at 17:19

## 2018-09-04 RX ADMIN — Medication 10 MILLIGRAM(S): at 06:18

## 2018-09-04 RX ADMIN — Medication 650 MILLIGRAM(S): at 13:54

## 2018-09-04 RX ADMIN — Medication 650 MILLIGRAM(S): at 13:00

## 2018-09-04 RX ADMIN — SODIUM CHLORIDE 75 MILLILITER(S): 9 INJECTION, SOLUTION INTRAVENOUS at 11:06

## 2018-09-04 RX ADMIN — Medication 12.5 MILLIGRAM(S): at 17:19

## 2018-09-04 RX ADMIN — ATORVASTATIN CALCIUM 80 MILLIGRAM(S): 80 TABLET, FILM COATED ORAL at 21:44

## 2018-09-04 NOTE — DISCHARGE NOTE ADULT - PLAN OF CARE
Do not stop your aspirin or Plavix unless instructed to do so by your cardiologist, they help keep your stented coronary arteries open.   No heavy lifting or pushing/pulling with procedure arm for 2 weeks. No driving for 2 days. You may shower 24 hours following the procedure but avoid baths/swimming for 1 week. Check your wrist site for bleeding and/or swelling daily following procedure and call your doctor immediately if it occurs or if you experience increased pain at the site. Follow up with your cardiologist in 1-2 weeks. You may call [facility] if you have any questions/concerns regarding your procedure [phone]. Patient remains chest pain free and understands post cath discharge instructions. Continue with your cholesterol medications. Eat a heart healthy diet that is low in saturated fats and salt, and includes whole grains, fruits, vegetables and lean protein; exercise regularly (consult with your physician or cardiologist first); maintain a heart healthy weight; if you smoke - quit (A resource to help you stop smoking is the [facility] – phone number 903-498-7692.). Continue to follow with your primary physician or cardiologist. Your LDL cholesterol will be less than 70mg/dL Continue with your blood pressure medications; eat a heart healthy diet with low salt diet; exercise regularly (consult with your physician or cardiologist first); maintain a heart healthy weight; if you smoke - quit (A resource to help you stop smoking is the [facility] – phone number 728-447-9466.); include healthy ways to manage stress. Continue to follow with your primary care physician or cardiologist. Your blood pressure will be controlled.

## 2018-09-04 NOTE — PROGRESS NOTE ADULT - ATTENDING COMMENTS
Thank you for the courtesy of the consultation,I would be available for any further discussion if needed.  Erasmo Guadarrama MD,FACC.  [address] [place], [place] [id number] [phone]

## 2018-09-04 NOTE — H&P CARDIOLOGY - HISTORY OF PRESENT ILLNESS
72 yr old  male with PMHx of HTN, HLD who presented in emergency [facility] with chest pain x 3 hours on 9/3/18. Pt states that his chest pain started suddenly while he was sitting, 6/10 in intensity, dull, radiating to his left arm and back on left side. Pt denied any associated palpitations sob, diaphoresis, dizziness associated with chest pain. Pt denied any recent exertional work or lifting heavy weights. Pt denied any previous episode of similar pain. Pt is able to ambulate with any shortness of breath at baseline. Pt denies any history of smoking or drug intake, but does acknowlegde alcohol intake (white wine) 1 glass per day.    In ED pts vitals were stable, first set of cardiac enzymes was negative with EKG showing NSR 70BMP, no ST segment & T wave changes. EKG does show RBBB with no comparison available      Troponin were 0.015> 11.4>29.2>36.6 ruling in as NSTEMI. Pt seen by Dr Chiara Zimmerman and started on ASA/Heparin drip.   Pt was transferred to [facility] for Cardiac Cath.

## 2018-09-04 NOTE — DISCHARGE NOTE ADULT - CARE PROVIDER_API CALL
Erasmo Guadarrama), Cardiology  [address] [id number]  Phone: [phone]  Fax: [phone]

## 2018-09-04 NOTE — DISCHARGE NOTE ADULT - PATIENT PORTAL LINK FT
You can access the [facility] Patient Portal, offered by [facility], by registering with the following website: [email or web address]

## 2018-09-04 NOTE — DISCHARGE NOTE ADULT - MEDICATION SUMMARY - MEDICATIONS TO TAKE
I will START or STAY ON the medications listed below when I get home from the hospital:    aspirin 81 mg oral tablet, chewable  -- 1 tab(s) by mouth once a day    home/hospital  -- Indication: For Helping keep stented arteries open    enalapril 10 mg oral tablet  -- 1 tab(s) by mouth once a day    home/hospital  -- Indication: For Hypertension    lovastatin 20 mg oral tablet  -- 1 tab(s) by mouth once a day (at bedtime)    home  -- Indication: For Hyperlipidemia    clopidogrel 75 mg oral tablet  -- 1 tab(s) by mouth once a day  -- Indication: For Helping keep stented arteries open    metoprolol tartrate 25 mg oral tablet  -- 0.5 tab(s) by mouth 2 times a day    hospital  -- Indication: For Hypertension    Vitamin D3 2000 intl units oral capsule  -- 1 cap(s) by mouth once a day  HOME  -- Indication: For vitamin

## 2018-09-04 NOTE — DISCHARGE NOTE ADULT - HOSPITAL COURSE
HPI:  72 yr old  male with PMHx of HTN, HLD who presented in emergency [facility] with chest pain x 3 hours on 9/3/18. Pt states that his chest pain started suddenly while he was sitting, 6/10 in intensity, dull, radiating to his left arm and back on left side. Pt denied any associated palpitations sob, diaphoresis, dizziness associated with chest pain. Pt denied any recent exertional work or lifting heavy weights. Pt denied any previous episode of similar pain. Pt is able to ambulate with any shortness of breath at baseline. Pt denies any history of smoking or drug intake, but does acknowlegde alcohol intake (white wine) 1 glass per day.    In ED pts vitals were stable, first set of cardiac enzymes was negative with EKG showing NSR 70BMP, no ST segment & T wave changes. EKG does show RBBB with no comparison available      Troponin were 0.015> 11.4>29.2>36.6 ruling in as NSTEMI. Pt seen by Dr Erasmo Guadarrama and started on ASA/Heparin drip.   Pt was transferred to [facility] for Cardiac Cath. (04 Sep 2018 12:01)    9/4 cardiac cath with POBA of RPDA. Right radial site without swelling, bleeding.

## 2018-09-04 NOTE — PROGRESS NOTE ADULT - SUBJECTIVE AND OBJECTIVE BOX
PRESENTING CC:NSTEMI    SUBJ: 72 yr old male from home with pmh of HTN,HLD who presented in emergency with chest pain-assoc Left arm pain-at rest,no dyspnea,is currently pain free-though has interrmittent substernal disconfort-no arrhythmias noted.      PMH -reviewed admission note, no change since admission  Heart failure: acute [ ] chronic [ ] acute or chronic [ ] diastolic [ ] systolic [ ] combined systolic and diastolic[ ]  VERONIQUE: ATN[ ] renal medullary necrosis [ ] CKD I [ ]CKDII [ ]CKD III [ ]CKD IV [ ]CKD V [ ]Other pathological lesions [ ]    MEDICATIONS  (STANDING):  aspirin  chewable 81 milliGRAM(s) Oral daily  atorvastatin 80 milliGRAM(s) Oral at bedtime  enalapril 10 milliGRAM(s) Oral daily  heparin  Infusion.  Unit(s)/Hr (13 mL/Hr) IV Continuous <Continuous>  metoprolol tartrate 12.5 milliGRAM(s) Oral two times a day    MEDICATIONS  (PRN):  acetaminophen   Tablet. 650 milliGRAM(s) Oral every 6 hours PRN Moderate Pain (4 - 6)  nitroglycerin     SubLingual 0.4 milliGRAM(s) SubLingual every 5 minutes PRN Chest Pain          FAMILY HISTORY:  No pertinent family history in first degree relatives  No family history of premature coronary artery disease or sudden cardiac death      REVIEW OF SYSTEMS:  Constitutional: [ ] fever, [ ]weight loss,  [ ]fatigue  Eyes: [ ] visual changes  Respiratory: [ ]shortness of breath;  [ ] cough, [ ]wheezing, [ ]chills, [ ]hemoptysis  Cardiovascular: [x ] chest pain, [ ]palpitations, [ ]dizziness,  [ ]leg swelling[ ]orthopnea[ ]PND  Gastrointestinal: [ ] abdominal pain, [ ]nausea, [ ]vomiting,  [ ]diarrhea   Genitourinary: [ ] dysuria, [ ] hematuria  Neurologic: [ ] headaches [ ] tremors[ ]weakness  Skin: [ ] itching, [ ]burning, [ ] rashes  Endocrine: [ ] heat or cold intolerance  Musculoskeletal: [ ] joint pain or swelling; [ ] muscle, back, or extremity pain  Psychiatric: [ ] depression, [ ]anxiety, [ ]mood swings, or [ ]difficulty sleeping  Hematologic: [ ] easy bruising, [ ] bleeding gums    [x] All remaining systems negative except as per above.   [ ]Unable to obtain.    Vital Signs Last 24 Hrs  T(C): 36.6 (04 Sep 2018 08:10), Max: 37.2 (03 Sep 2018 20:42)  T(F): 97.8 (04 Sep 2018 08:10), Max: 99 (03 Sep 2018 20:42)  HR: 62 (04 Sep 2018 08:10) (56 - 81)  BP: 134/65 (04 Sep 2018 08:10) (113/60 - 139/76)  RR: 17 (04 Sep 2018 08:10) (16 - 18)  SpO2: 98% (04 Sep 2018 08:10) (97% - 100%)  I&O's Summary      PHYSICAL EXAM:  General: No acute distress BMI-26.9  HEENT: EOMI, PERRL  Neck: Supple, [ ] JVD  Lungs: Equal air entry bilaterally; [ ] rales [ ] wheezing [ ] rhonchi  Heart: Regular rate and rhythm; [x ] murmur  2 /6 [x ] systolic [ ] diastolic [ ] radiation[ ] rubs [ ]  gallops  Abdomen: Nontender, bowel sounds present  Extremities: No clubbing, cyanosis, [ ] edema  Nervous system:  Alert & Oriented X3, no focal deficits  Psychiatric: Normal affect  Skin: No rashes or lesions    LABS:  09-04    140  |  106  |  12  ----------------------------<  112<H>  4.3   |  24  |  0.65    Ca    8.3<L>      04 Sep 2018 05:08  Phos  2.3     09-04  Mg     2.0     09-04    TPro  6.6  /  Alb  3.1<L>  /  TBili  0.8  /  DBili  x   /  AST  247<H>  /  ALT  40  /  AlkPhos  51  09-04    Creatinine Trend: 0.65<--, 1.04<--                        17.2   10.8  )-----------( 142      ( 04 Sep 2018 05:08 )             52.0     PT/INR - ( 04 Sep 2018 05:08 )   PT: 11.9 sec;   INR: 1.09 ratio         PTT - ( 04 Sep 2018 05:07 )  PTT:130.8 sec  Lipid Panel:   Cardiac Enzymes: CARDIAC MARKERS ( 04 Sep 2018 05:20 )  36.600 ng/mL / x     / x     / x     / x      CARDIAC MARKERS ( 04 Sep 2018 05:08 )  x     / x     / 1650 U/L / x     / 116.4 ng/mL  CARDIAC MARKERS ( 04 Sep 2018 00:52 )  29.200 ng/mL / x     / 1471 U/L / x     / 111.9 ng/mL  CARDIAC MARKERS ( 03 Sep 2018 17:55 )  11.400 ng/mL / x     / 701 U/L / x     / 44.4 ng/mL  CARDIAC MARKERS ( 03 Sep 2018 10:45 )  <0.015 ng/mL / x     / x     / x     / x          ECG [my interpretation]:Sinus rhythm no acute ST T wave abnormalities    TELEMETRY:sinus rhythm no arrhythmias      IMPRESSION AND PLAN:      Problem/Recommendation - 1:  Problem: NSTEMI (non-ST elevated myocardial infarction). Recommendation: Chest pain-ACS-NSTEMI-remains chest pain free-Elevated troponins,  No evidence for Heart Failure  Continue Heparin gtt,BBlockers Aspirin and statins.  For cardiac cath today-PEDRO Astudillo.Transfer center called.    Problem/Recommendation - 2:  ·  Problem: Essential hypertension.  Recommendation: BP stable.     Problem/Recommendation - 3:  ·  Problem: Hyperlipidemia, unspecified hyperlipidemia type.  Recommendation: Lipid Profile.  continue Statins.

## 2018-09-04 NOTE — CHART NOTE - NSCHARTNOTEFT_GEN_A_CORE
Patient underwent a PCI procedure and is being admitted as they are at increased risk for major adverse cardiac and vascular events if discharged due to the following high risk characteristics:      Pre- Procedural Clinical Criteria  -Unstable angina     Admit- patient underwent a PCI procedure and is being admitted due to high risk characteristicts and is considered to be at an increased risk of major adverse cardiovascular events if discharged at this time   -RPDA lesion

## 2018-09-04 NOTE — DISCHARGE NOTE ADULT - CARE PLAN
Principal Discharge DX:	Coronary artery disease of native artery of native heart with stable angina pectoris  Goal:	Patient remains chest pain free and understands post cath discharge instructions.  Assessment and plan of treatment:	Do not stop your aspirin or Plavix unless instructed to do so by your cardiologist, they help keep your stented coronary arteries open.   No heavy lifting or pushing/pulling with procedure arm for 2 weeks. No driving for 2 days. You may shower 24 hours following the procedure but avoid baths/swimming for 1 week. Check your wrist site for bleeding and/or swelling daily following procedure and call your doctor immediately if it occurs or if you experience increased pain at the site. Follow up with your cardiologist in 1-2 weeks. You may call [facility] if you have any questions/concerns regarding your procedure [phone].  Secondary Diagnosis:	Hypertension, unspecified type  Goal:	Your blood pressure will be controlled.  Assessment and plan of treatment:	Continue with your blood pressure medications; eat a heart healthy diet with low salt diet; exercise regularly (consult with your physician or cardiologist first); maintain a heart healthy weight; if you smoke - quit (A resource to help you stop smoking is the [facility] [employer] – phone number [phone].); include healthy ways to manage stress. Continue to follow with your primary care physician or cardiologist.  Secondary Diagnosis:	Hyperlipidemia, unspecified hyperlipidemia type  Goal:	Your LDL cholesterol will be less than 70mg/dL  Assessment and plan of treatment:	Continue with your cholesterol medications. Eat a heart healthy diet that is low in saturated fats and salt, and includes whole grains, fruits, vegetables and lean protein; exercise regularly (consult with your physician or cardiologist first); maintain a heart healthy weight; if you smoke - quit (A resource to help you stop smoking is the [facility] [employer] – phone number [phone].). Continue to follow with your primary physician or cardiologist.

## 2018-09-04 NOTE — CHART NOTE - NSCHARTNOTEFT_GEN_A_CORE
I got paged to check the patient as Troponins 3rd set was 29  After discussion with Dr. Covarrubias PGY3 , I was adviced to get and EKG   EKG showed   pt is being transferred in the morning to [place]   To be followed by the primary team in the morning   Thank You I got paged to check the patient as Troponin 3rd set was 29  After discussion with Dr. Covarrubias PGY3 , I was advised to get a repeat EKG   EKG showed no new changes .  pt is stable   next troponin at AM to be followed   c/w current care  pt is being transferred in the morning to [place]   To be followed by the primary team in the morning   Thank You I got paged to check the patient as Troponin 3rd set was 29  After discussion with Dr. Covarrubias PGY3 , I was advised to get a repeat EKG   EKG showed no new changes .  pt is stable   Morning Troponin : 36  c/w current care  pt is being transferred in the morning to [place]   To be followed by the primary team in the morning   Thank You

## 2018-09-04 NOTE — CHART NOTE - NSCHARTNOTEFT_GEN_A_CORE
Attending Medicine Covering Dr. Gaxiola    Patient seen and examined; patient ruled in MI last night without EKG changes; Discussed with cardio Dr. Guadarrama arranged for Coronary Angio at [facility] today, awaiting transfer    Vital Signs Last 24 Hrs  T(C): 36.6 (04 Sep 2018 08:10), Max: 37.2 (03 Sep 2018 20:42)  T(F): 97.8 (04 Sep 2018 08:10), Max: 99 (03 Sep 2018 20:42)  HR: 62 (04 Sep 2018 08:10) (56 - 81)  BP: 134/65 (04 Sep 2018 08:10) (113/60 - 134/65)  RR: 17 (04 Sep 2018 08:10) (16 - 18)  SpO2: 98% (04 Sep 2018 08:10) (97% - 99%)    P/E: Clinically unchanged    labs:                        17.2   10.8  )-----------( 142      ( 04 Sep 2018 05:08 )             52.0   09-04    140  |  106  |  12  ----------------------------<  112<H>  4.3   |  24  |  0.65    Ca    8.3<L>      04 Sep 2018 05:08  Phos  2.3     09-04  Mg     2.0     09-04    TPro  6.6  /  Alb  3.1<L>  /  TBili  0.8  /  DBili  x   /  AST  247<H>  /  ALT  40  /  AlkPhos  51  09-04    Hemoglobin A1C, Whole Blood (09.03.18 @ 22:41)    Hemoglobin A1C, Whole Blood: 5.8: Method: Immunoassay  Thyroid Stimulating Hormone, Serum (09.03.18 @ 17:55)    Thyroid Stimulating Hormone, Serum: 0.77 uU/mL  Lipid Profile (09.04.18 @ 05:08)    Total Cholesterol/HDL Ratio Measurement: 3.5 RATIO    Cholesterol, Serum: 159 mg/dL    Triglycerides, Serum: 105 mg/dL    HDL Cholesterol, Serum: 46: HDL Levels >/= 60 mg/dL are considered beneficial and a "negative" risk  factor.  Effective 08/15/2018: New reference range and interpretive comment. mg/dL    Direct LDL: 92: LDL Cholesterol --- Interpretive Comment (for adults 18 and over)    D/D;  NSTEMI  HTN  Hyperlipidemia    Plan:  Continue ASA, Heparin drip, beta blocker and ARB and Statin  Plan as per Cardio after Cath  d/w Dr. Guadarrama    Discussed with daughter at bedside findings, plan of care and expected findings and plan  Discussed with HUNTER Candelaria and NP [place]  Patient medically stable for transfer

## 2018-09-05 VITALS
TEMPERATURE: 98 F | SYSTOLIC BLOOD PRESSURE: 106 MMHG | OXYGEN SATURATION: 95 % | DIASTOLIC BLOOD PRESSURE: 62 MMHG | HEART RATE: 69 BPM | RESPIRATION RATE: 17 BRPM

## 2018-09-05 DIAGNOSIS — I25.118 ATHEROSCLEROTIC HEART DISEASE OF NATIVE CORONARY ARTERY WITH OTHER FORMS OF ANGINA PECTORIS: ICD-10-CM

## 2018-09-05 DIAGNOSIS — I10 ESSENTIAL (PRIMARY) HYPERTENSION: ICD-10-CM

## 2018-09-05 DIAGNOSIS — E78.5 HYPERLIPIDEMIA, UNSPECIFIED: ICD-10-CM

## 2018-09-05 LAB
ANION GAP SERPL CALC-SCNC: 13 MMOL/L — SIGNIFICANT CHANGE UP (ref 5–17)
BASOPHILS # BLD AUTO: 0 K/UL — SIGNIFICANT CHANGE UP (ref 0–0.2)
BASOPHILS NFR BLD AUTO: 0 % — SIGNIFICANT CHANGE UP (ref 0–2)
BUN SERPL-MCNC: 14 MG/DL — SIGNIFICANT CHANGE UP (ref 7–23)
CALCIUM SERPL-MCNC: 8.9 MG/DL — SIGNIFICANT CHANGE UP (ref 8.4–10.5)
CHLORIDE SERPL-SCNC: 103 MMOL/L — SIGNIFICANT CHANGE UP (ref 96–108)
CO2 SERPL-SCNC: 21 MMOL/L — LOW (ref 22–31)
CREAT SERPL-MCNC: 0.88 MG/DL — SIGNIFICANT CHANGE UP (ref 0.5–1.3)
EOSINOPHIL # BLD AUTO: 0 K/UL — SIGNIFICANT CHANGE UP (ref 0–0.5)
EOSINOPHIL NFR BLD AUTO: 0.1 % — SIGNIFICANT CHANGE UP (ref 0–6)
GLUCOSE SERPL-MCNC: 115 MG/DL — HIGH (ref 70–99)
HCT VFR BLD CALC: 48.2 % — SIGNIFICANT CHANGE UP (ref 39–50)
HGB BLD-MCNC: 16.3 G/DL — SIGNIFICANT CHANGE UP (ref 13–17)
LYMPHOCYTES # BLD AUTO: 0.9 K/UL — LOW (ref 1–3.3)
LYMPHOCYTES # BLD AUTO: 7.1 % — LOW (ref 13–44)
MCHC RBC-ENTMCNC: 31.7 PG — SIGNIFICANT CHANGE UP (ref 27–34)
MCHC RBC-ENTMCNC: 33.8 GM/DL — SIGNIFICANT CHANGE UP (ref 32–36)
MCV RBC AUTO: 93.9 FL — SIGNIFICANT CHANGE UP (ref 80–100)
MONOCYTES # BLD AUTO: 1.4 K/UL — HIGH (ref 0–0.9)
MONOCYTES NFR BLD AUTO: 11.6 % — SIGNIFICANT CHANGE UP (ref 2–14)
NEUTROPHILS # BLD AUTO: 10 K/UL — HIGH (ref 1.8–7.4)
NEUTROPHILS NFR BLD AUTO: 81.2 % — HIGH (ref 43–77)
PLATELET # BLD AUTO: 161 K/UL — SIGNIFICANT CHANGE UP (ref 150–400)
POTASSIUM SERPL-MCNC: 4.3 MMOL/L — SIGNIFICANT CHANGE UP (ref 3.5–5.3)
POTASSIUM SERPL-SCNC: 4.3 MMOL/L — SIGNIFICANT CHANGE UP (ref 3.5–5.3)
RBC # BLD: 5.14 M/UL — SIGNIFICANT CHANGE UP (ref 4.2–5.8)
RBC # FLD: 13.3 % — SIGNIFICANT CHANGE UP (ref 10.3–14.5)
SODIUM SERPL-SCNC: 137 MMOL/L — SIGNIFICANT CHANGE UP (ref 135–145)
WBC # BLD: 12.3 K/UL — HIGH (ref 3.8–10.5)
WBC # FLD AUTO: 12.3 K/UL — HIGH (ref 3.8–10.5)

## 2018-09-05 RX ORDER — METOPROLOL TARTRATE 50 MG
0.5 TABLET ORAL
Qty: 30 | Refills: 0
Start: 2018-09-05 | End: 2018-10-04

## 2018-09-05 RX ADMIN — Medication 10 MILLIGRAM(S): at 05:46

## 2018-09-05 RX ADMIN — Medication 81 MILLIGRAM(S): at 05:46

## 2018-09-05 RX ADMIN — Medication 12.5 MILLIGRAM(S): at 05:45

## 2018-09-05 RX ADMIN — CLOPIDOGREL BISULFATE 75 MILLIGRAM(S): 75 TABLET, FILM COATED ORAL at 05:45

## 2018-09-05 NOTE — PROGRESS NOTE ADULT - ATTENDING COMMENTS
Thank you for the courtesy of the consultation,I would be available for any further discussion if needed.  Erasmo Guadarrama MD,FACC.  [address] [place], [place] [id number] [phone]
Gregory Wayne, [facility]    [phone]

## 2018-09-05 NOTE — PROGRESS NOTE ADULT - ASSESSMENT
What Is The Reason For Today's Visit?: Full Body Skin Examination What Is The Reason For Today's Visit? (Being Monitored For X): concerning skin lesions on an annual basis HPI:  72 yr old  male with PMHx of HTN, HLD who presented in emergency [facility] with chest pain x 3 hours on 9/3/18. Pt states that his chest pain started suddenly while he was sitting, 6/10 in intensity, dull, radiating to his left arm and back on left side. Pt denied any associated palpitations sob, diaphoresis, dizziness associated with chest pain. Pt denied any recent exertional work or lifting heavy weights. Pt denied any previous episode of similar pain. Pt is able to ambulate with any shortness of breath at baseline. Pt denies any history of smoking or drug intake, but does acknowlegde alcohol intake (white wine) 1 glass per day.    In ED pts vitals were stable, first set of cardiac enzymes was negative with EKG showing NSR 70BMP, no ST segment & T wave changes. EKG does show RBBB with no comparison available      Troponin were 0.015> 11.4>29.2>36.6 ruling in as NSTEMI. Pt seen by Dr Erasmo Guadarrama and started on ASA/Heparin drip.   Pt was transferred to [facility] for Cardiac Cath. (04 Sep 2018 12:01)

## 2018-09-05 NOTE — PROGRESS NOTE ADULT - SUBJECTIVE AND OBJECTIVE BOX
PRESENTING CC:Chest Pain    SUBJ: 72 yr old male from home with pmh of HTN,HLD who presented in emergency with chest pain-assoc Left arm pain-at rest,no dyspnea,transferred to [facility] for cath-occlusion dRCA s/p POBA-remains chest pain free.    PMH -reviewed admission note, no change since admission  Heart failure: acute [ ] chronic [ ] acute or chronic [ ] diastolic [ ] systolic [ ] combined systolic and diastolic[ ]  VERONIQUE: ATN[ ] renal medullary necrosis [ ] CKD I [ ]CKDII [ ]CKD III [ ]CKD IV [ ]CKD V [ ]Other pathological lesions [ ]    MEDICATIONS  (STANDING):  aspirin  chewable 81 milliGRAM(s) Oral daily  atorvastatin 80 milliGRAM(s) Oral at bedtime  clopidogrel Tablet 75 milliGRAM(s) Oral daily  enalapril 10 milliGRAM(s) Oral daily  metoprolol tartrate 12.5 milliGRAM(s) Oral two times a day      FAMILY HISTORY:  No pertinent family history in first degree relatives  No family history of premature coronary artery disease or sudden cardiac death      REVIEW OF SYSTEMS:  Constitutional: [ ] fever, [ ]weight loss,  [ ]fatigue  Eyes: [ ] visual changes  Respiratory: [ ]shortness of breath;  [ ] cough, [ ]wheezing, [ ]chills, [ ]hemoptysis  Cardiovascular: [ ] chest pain, [ ]palpitations, [ ]dizziness,  [ ]leg swelling[ ]orthopnea[ ]PND  Gastrointestinal: [ ] abdominal pain, [ ]nausea, [ ]vomiting,  [ ]diarrhea   Genitourinary: [ ] dysuria, [ ] hematuria  Neurologic: [ ] headaches [ ] tremors[ ]weakness  Skin: [ ] itching, [ ]burning, [ ] rashes  Endocrine: [ ] heat or cold intolerance  Musculoskeletal: [ ] joint pain or swelling; [ ] muscle, back, or extremity pain  Psychiatric: [ ] depression, [ ]anxiety, [ ]mood swings, or [ ]difficulty sleeping  Hematologic: [ ] easy bruising, [ ] bleeding gums    [x] All remaining systems negative except as per above.   [ ]Unable to obtain.    Vital Signs Last 24 Hrs  T(C): 36.9 (05 Sep 2018 04:33), Max: 37.3 (04 Sep 2018 21:25)  T(F): 98.5 (05 Sep 2018 04:33), Max: 99.2 (04 Sep 2018 21:25)  HR: 67 (05 Sep 2018 04:33) (62 - 74)  BP: 117/63 (05 Sep 2018 04:33) (99/87 - 135/79)  BP(mean): 97 (04 Sep 2018 12:01) (97 - 97)  RR: 18 (05 Sep 2018 04:33) (16 - 18)  SpO2: 93% (05 Sep 2018 04:33) (93% - 100%)  I&O's Summary    04 Sep 2018 07:01  -  05 Sep 2018 07:00  --------------------------------------------------------  IN: 1080 mL / OUT: 400 mL / NET: 680 mL        PHYSICAL EXAM:  General: No acute distress BMI-28  HEENT: EOMI, PERRL  Neck: Supple, [ ] JVD  Lungs: Equal air entry bilaterally; [ ] rales [ ] wheezing [ ] rhonchi  Heart: Regular rate and rhythm; [x ] murmur  2 /6 [x ] systolic [ ] diastolic [ ] radiation[ ] rubs [ ]  gallops  Abdomen: Nontender, bowel sounds present  Extremities: No clubbing, cyanosis, [ ] edema  Nervous system:  Alert & Oriented X3, no focal deficits  Psychiatric: Normal affect  Skin: No rashes or lesions    LABS:  09-05    137  |  103  |  14  ----------------------------<  115<H>  4.3   |  21<L>  |  0.88    Ca    8.9      05 Sep 2018 00:23  Phos  2.3     09-04  Mg     2.0     09-04    TPro  6.6  /  Alb  3.1<L>  /  TBili  0.8  /  DBili  x   /  AST  247<H>  /  ALT  40  /  AlkPhos  51  09-04    Creatinine Trend: 0.88<--, 0.65<--, 1.04<--                        16.3   12.3  )-----------( 161      ( 05 Sep 2018 00:23 )             48.2     PT/INR - ( 04 Sep 2018 05:08 )   PT: 11.9 sec;   INR: 1.09 ratio         PTT - ( 04 Sep 2018 05:07 )  PTT:130.8 sec  Lipid Panel:   Cardiac Enzymes: CARDIAC MARKERS ( 04 Sep 2018 05:20 )  36.600 ng/mL / x     / x     / x     / x      CARDIAC MARKERS ( 04 Sep 2018 05:08 )  x     / x     / 1650 U/L / x     / 116.4 ng/mL  CARDIAC MARKERS ( 04 Sep 2018 00:52 )  29.200 ng/mL / x     / 1471 U/L / x     / 111.9 ng/mL  CARDIAC MARKERS ( 03 Sep 2018 17:55 )  11.400 ng/mL / x     / 701 U/L / x     / 44.4 ng/mL  CARDIAC MARKERS ( 03 Sep 2018 10:45 )  <0.015 ng/mL / x     / x     / x     / x          ECHO:PRELIM-[facility]-Normal LV Systolic Function    CATHETERIZATION:  CORONARY VESSELS: The coronary circulation is right dominant.  LM:   --  LM: Normal.  LAD:   --  LAD: Angiography showed minor luminal irregularitieswith no flow limiting lesions.  CX:   --  Circumflex: Angiography showed minor luminal irregularities with no flow limiting lesions.  RCA:   --  Distal RCA: There was a 100 % stenosis.A successful balloon angioplasty was performed on the 100 % lesion in the distal RCA. Following intervention there was a 30 % residual stenosis.       IMPRESSION AND PLAN:    Problem/Recommendation - 1:  Problem: NSTEMI (non-ST elevated myocardial infarction). Recommendation: Chest pain-ACS-NSTEMI-remains chest pain free-Elevated troponins,  No evidence for Heart Failure  Cath-Occlusion dRCA-s/p POBA.  Continue DAPT.  Stable for discharge today.  Follow up Dr Gaxiola in 1 week.      Problem/Recommendation - 2:  ·  Problem: Essential hypertension.  Recommendation: BP stable.     Problem/Recommendation - 3:  ·  Problem: Hyperlipidemia, unspecified hyperlipidemia type.  Recommendation: Lipid Profile.  continue Statins.

## 2018-09-05 NOTE — PROGRESS NOTE ADULT - SUBJECTIVE AND OBJECTIVE BOX
ECG: SR with right BBB 68 bpm    Cath: POBA of RPDA    Imaging:    Interpretation of Telemetry: Patient is a 72y old  Male who presents with a chief complaint of chest pain (05 Sep 2018 00:48)          Allergies    No Known Allergies    Intolerances        Medications:  aspirin  chewable 81 milliGRAM(s) Oral daily  atorvastatin 80 milliGRAM(s) Oral at bedtime  clopidogrel Tablet 75 milliGRAM(s) Oral daily  enalapril 10 milliGRAM(s) Oral daily  metoprolol tartrate 12.5 milliGRAM(s) Oral two times a day      Vitals:  T(C): 37.3 (18 @ 21:25), Max: 37.3 (18 @ 21:25)  HR: 72 (18 @ 23:55) (60 - 74)  BP: 103/64 (18 @ 23:55) (99/87 - 135/79)  BP(mean): 97 (18 @ 12:01) (97 - 97)  RR: 18 (18 @ 23:55) (16 - 18)  SpO2: 94% (18 @ 23:55) (94% - 100%)  Wt(kg): --  Daily Height in cm: 162.56 (04 Sep 2018 12:01)    Daily Weight in k.9 (04 Sep 2018 12:01)  I&O's Summary    04 Sep 2018 07:01  -  05 Sep 2018 02:07  --------------------------------------------------------  IN: 480 mL / OUT: 0 mL / NET: 480 mL          Physical Exam:  Appearance: Normal  Eyes: PERRL, EOMI  HENT: Normal oral muscosa, NC/AT  Cardiovascular: S1S2, RRR, No M/R/G, no JVD, No Lower extremity edema  Procedural Access Site: No hematoma, Non-tender to palpation, 2+ pulse, No bruit, No Ecchymosis  Respiratory: Clear to auscultation bilaterally  Gastrointestinal: Soft, Non tender, Normal Bowel Sounds  Musculoskeletal: No clubbing, No joint deformity   Neurologic: Non-focal  Lymphatic: No lymphadenopathy  Psychiatry: AAOx3, Mood & affect appropriate  Skin: No rashes, No ecchymoses, No cyanosis        137  |  103  |  14  ----------------------------<  115<H>  4.3   |  21<L>  |  0.88    Ca    8.9      05 Sep 2018 00:23  Phos  2.3     -  Mg     2.0     -    TPro  6.6  /  Alb  3.1<L>  /  TBili  0.8  /  DBili  x   /  AST  247<H>  /  ALT  40  /  AlkPhos  51  -04    PT/INR - ( 04 Sep 2018 05:08 )   PT: 11.9 sec;   INR: 1.09 ratio         PTT - ( 04 Sep 2018 05:07 )  PTT:130.8 sec  CARDIAC MARKERS ( 04 Sep 2018 05:20 )  36.600 ng/mL / x     / x     / x     / x      CARDIAC MARKERS ( 04 Sep 2018 05:08 )  x     / x     / 1650 U/L / x     / 116.4 ng/mL  CARDIAC MARKERS ( 04 Sep 2018 00:52 )  29.200 ng/mL / x     / 1471 U/L / x     / 111.9 ng/mL  CARDIAC MARKERS ( 03 Sep 2018 17:55 )  11.400 ng/mL / x     / 701 U/L / x     / 44.4 ng/mL  CARDIAC MARKERS ( 03 Sep 2018 10:45 )  <0.015 ng/mL / x     / x     / x     / x            Lipid panel Total Cholesterol: 159  LDL: 92  HDL: 46  T      Hgb A1c                         16.3   12.3  )-----------( 161      ( 05 Sep 2018 00:23 )             48.2           ECG: SR with right BBB 68 bpm    Cath: POBA of RPDA    Imaging:    Interpretation of Telemetry: Patient is a 72y old  Male who presents with a chief complaint of chest pain (05 Sep 2018 00:48)          Allergies    No Known Allergies    Intolerances        Medications:  aspirin  chewable 81 milliGRAM(s) Oral daily  atorvastatin 80 milliGRAM(s) Oral at bedtime  clopidogrel Tablet 75 milliGRAM(s) Oral daily  enalapril 10 milliGRAM(s) Oral daily  metoprolol tartrate 12.5 milliGRAM(s) Oral two times a day      Vitals:  T(C): 37.3 (18 @ 21:25), Max: 37.3 (18 @ 21:25)  HR: 72 (18 @ 23:55) (60 - 74)  BP: 103/64 (18 @ 23:55) (99/87 - 135/79)  BP(mean): 97 (18 @ 12:01) (97 - 97)  RR: 18 (18 @ 23:55) (16 - 18)  SpO2: 94% (18 @ 23:55) (94% - 100%)  Wt(kg): --  Daily Height in cm: 162.56 (04 Sep 2018 12:01)    Daily Weight in k.9 (04 Sep 2018 12:01)  I&O's Summary    04 Sep 2018 07:01  -  05 Sep 2018 02:07  --------------------------------------------------------  IN: 480 mL / OUT: 0 mL / NET: 480 mL          Physical Exam:  Appearance: Normal  Eyes: PERRL, EOMI  HENT: Normal oral muscosa, NC/AT  Cardiovascular: S1S2, RRR, No M/R/G, no JVD, No Lower extremity edema  Procedural Access Site: No hematoma, Non-tender to palpation, 2+ pulse, No bruit, No Ecchymosis  Respiratory: Clear to auscultation bilaterally  Gastrointestinal: Soft, Non tender, Normal Bowel Sounds  Musculoskeletal: No clubbing, No joint deformity   Neurologic: Non-focal  Lymphatic: No lymphadenopathy  Psychiatry: AAOx3, Mood & affect appropriate  Skin: No rashes, No ecchymoses, No cyanosis        137  |  103  |  14  ----------------------------<  115<H>  4.3   |  21<L>  |  0.88    Ca    8.9      05 Sep 2018 00:23  Phos  2.3     -  Mg     2.0     -    TPro  6.6  /  Alb  3.1<L>  /  TBili  0.8  /  DBili  x   /  AST  247<H>  /  ALT  40  /  AlkPhos  51  -04    PT/INR - ( 04 Sep 2018 05:08 )   PT: 11.9 sec;   INR: 1.09 ratio         PTT - ( 04 Sep 2018 05:07 )  PTT:130.8 sec  CARDIAC MARKERS ( 04 Sep 2018 05:20 )  36.600 ng/mL / x     / x     / x     / x      CARDIAC MARKERS ( 04 Sep 2018 05:08 )  x     / x     / 1650 U/L / x     / 116.4 ng/mL  CARDIAC MARKERS ( 04 Sep 2018 00:52 )  29.200 ng/mL / x     / 1471 U/L / x     / 111.9 ng/mL  CARDIAC MARKERS ( 03 Sep 2018 17:55 )  11.400 ng/mL / x     / 701 U/L / x     / 44.4 ng/mL  CARDIAC MARKERS ( 03 Sep 2018 10:45 )  <0.015 ng/mL / x     / x     / x     / x            Lipid panel Total Cholesterol: 159  LDL: 92  HDL: 46  T      Hgb A1c                         16.3   12.3  )-----------( 161      ( 05 Sep 2018 00:23 )             48.2           ECG: SR with right BBB 68 bpm    Cath: POBA of RPDA    Imaging:    Interpretation of Telemetry: SR 70-80 bpm

## 2018-09-06 PROBLEM — Z00.00 ENCOUNTER FOR PREVENTIVE HEALTH EXAMINATION: Status: ACTIVE | Noted: 2018-09-06

## 2018-09-12 PROBLEM — R07.9 CHEST PAIN: Status: ACTIVE | Noted: 2018-09-12

## 2018-09-12 RX ORDER — ASPIRIN ENTERIC COATED TABLETS 81 MG 81 MG/1
81 TABLET, DELAYED RELEASE ORAL
Refills: 0 | Status: ACTIVE | COMMUNITY

## 2018-09-13 ENCOUNTER — OUTPATIENT (OUTPATIENT)
Dept: OUTPATIENT SERVICES | Facility: HOSPITAL | Age: 73
LOS: 1 days | End: 2018-09-13
Payer: MEDICARE

## 2018-09-13 ENCOUNTER — NON-APPOINTMENT (OUTPATIENT)
Age: 73
End: 2018-09-13

## 2018-09-13 ENCOUNTER — APPOINTMENT (OUTPATIENT)
Dept: CARDIOLOGY | Facility: CLINIC | Age: 73
End: 2018-09-13
Payer: MEDICARE

## 2018-09-13 VITALS
WEIGHT: 163 LBS | OXYGEN SATURATION: 96 % | HEART RATE: 55 BPM | SYSTOLIC BLOOD PRESSURE: 110 MMHG | HEIGHT: 65 IN | DIASTOLIC BLOOD PRESSURE: 71 MMHG | BODY MASS INDEX: 27.16 KG/M2

## 2018-09-13 DIAGNOSIS — Z98.890 OTHER SPECIFIED POSTPROCEDURAL STATES: Chronic | ICD-10-CM

## 2018-09-13 DIAGNOSIS — R07.9 CHEST PAIN, UNSPECIFIED: ICD-10-CM

## 2018-09-13 DIAGNOSIS — E78.5 HYPERLIPIDEMIA, UNSPECIFIED: ICD-10-CM

## 2018-09-13 PROCEDURE — 99214 OFFICE O/P EST MOD 30 MIN: CPT

## 2018-09-13 PROCEDURE — 93923 UPR/LXTR ART STDY 3+ LVLS: CPT | Mod: 26

## 2018-09-13 PROCEDURE — 93000 ELECTROCARDIOGRAM COMPLETE: CPT

## 2018-09-13 PROCEDURE — 93923 UPR/LXTR ART STDY 3+ LVLS: CPT

## 2018-10-02 ENCOUNTER — MEDICATION RENEWAL (OUTPATIENT)
Age: 73
End: 2018-10-02

## 2018-10-25 PROCEDURE — 80048 BASIC METABOLIC PNL TOTAL CA: CPT

## 2018-10-25 PROCEDURE — 84443 ASSAY THYROID STIM HORMONE: CPT

## 2018-10-25 PROCEDURE — 85027 COMPLETE CBC AUTOMATED: CPT

## 2018-10-25 PROCEDURE — 93005 ELECTROCARDIOGRAM TRACING: CPT

## 2018-10-25 PROCEDURE — 80061 LIPID PANEL: CPT

## 2018-10-25 PROCEDURE — 93454 CORONARY ARTERY ANGIO S&I: CPT | Mod: 59

## 2018-10-25 PROCEDURE — 84484 ASSAY OF TROPONIN QUANT: CPT

## 2018-10-25 PROCEDURE — 71046 X-RAY EXAM CHEST 2 VIEWS: CPT

## 2018-10-25 PROCEDURE — 92941 PRQ TRLML REVSC TOT OCCL AMI: CPT | Mod: RC

## 2018-10-25 PROCEDURE — 85730 THROMBOPLASTIN TIME PARTIAL: CPT

## 2018-10-25 PROCEDURE — 82746 ASSAY OF FOLIC ACID SERUM: CPT

## 2018-10-25 PROCEDURE — 99152 MOD SED SAME PHYS/QHP 5/>YRS: CPT

## 2018-10-25 PROCEDURE — 82306 VITAMIN D 25 HYDROXY: CPT

## 2018-10-25 PROCEDURE — C1725: CPT

## 2018-10-25 PROCEDURE — 84100 ASSAY OF PHOSPHORUS: CPT

## 2018-10-25 PROCEDURE — C1894: CPT

## 2018-10-25 PROCEDURE — 85610 PROTHROMBIN TIME: CPT

## 2018-10-25 PROCEDURE — 93306 TTE W/DOPPLER COMPLETE: CPT

## 2018-10-25 PROCEDURE — 83735 ASSAY OF MAGNESIUM: CPT

## 2018-10-25 PROCEDURE — 80053 COMPREHEN METABOLIC PANEL: CPT

## 2018-10-25 PROCEDURE — 83036 HEMOGLOBIN GLYCOSYLATED A1C: CPT

## 2018-10-25 PROCEDURE — 82607 VITAMIN B-12: CPT

## 2018-10-25 PROCEDURE — 99285 EMERGENCY DEPT VISIT HI MDM: CPT | Mod: 25

## 2018-10-25 PROCEDURE — 82553 CREATINE MB FRACTION: CPT

## 2018-10-25 PROCEDURE — C1887: CPT

## 2018-10-25 PROCEDURE — 82550 ASSAY OF CK (CPK): CPT

## 2018-10-25 PROCEDURE — C1769: CPT

## 2018-12-13 ENCOUNTER — APPOINTMENT (OUTPATIENT)
Dept: CARDIOLOGY | Facility: CLINIC | Age: 73
End: 2018-12-13
Payer: MEDICARE

## 2018-12-13 ENCOUNTER — NON-APPOINTMENT (OUTPATIENT)
Age: 73
End: 2018-12-13

## 2018-12-13 VITALS — SYSTOLIC BLOOD PRESSURE: 106 MMHG | HEART RATE: 50 BPM | DIASTOLIC BLOOD PRESSURE: 65 MMHG

## 2018-12-13 PROCEDURE — 99214 OFFICE O/P EST MOD 30 MIN: CPT

## 2018-12-13 PROCEDURE — 93000 ELECTROCARDIOGRAM COMPLETE: CPT

## 2018-12-13 NOTE — REVIEW OF SYSTEMS
[Feeling Fatigued] : feeling fatigued [Dyspnea on exertion] : dyspnea during exertion [Negative] : Heme/Lymph

## 2018-12-14 LAB
ALBUMIN SERPL ELPH-MCNC: 4.3 G/DL
ALP BLD-CCNC: 61 U/L
ALT SERPL-CCNC: 19 U/L
ANION GAP SERPL CALC-SCNC: 5 MMOL/L
AST SERPL-CCNC: 25 U/L
BILIRUB SERPL-MCNC: 0.5 MG/DL
BUN SERPL-MCNC: 14 MG/DL
CALCIUM SERPL-MCNC: 9.4 MG/DL
CHLORIDE SERPL-SCNC: 105 MMOL/L
CHOLEST SERPL-MCNC: 147 MG/DL
CHOLEST/HDLC SERPL: 3 RATIO
CO2 SERPL-SCNC: 28 MMOL/L
CREAT SERPL-MCNC: 0.91 MG/DL
GLUCOSE SERPL-MCNC: 106 MG/DL
HDLC SERPL-MCNC: 49 MG/DL
LDLC SERPL CALC-MCNC: 82 MG/DL
POTASSIUM SERPL-SCNC: 5.2 MMOL/L
PROT SERPL-MCNC: 6.7 G/DL
SODIUM SERPL-SCNC: 138 MMOL/L
TRIGL SERPL-MCNC: 78 MG/DL

## 2018-12-26 NOTE — HISTORY OF PRESENT ILLNESS
[FreeTextEntry1] : returning for f/u\par No CP\par No NAJERA\par No LE edema\par No palpitations\par No syncope or blackouts\par

## 2018-12-26 NOTE — PHYSICAL EXAM
[General Appearance - Well Developed] : well developed [Normal Appearance] : normal appearance [Well Groomed] : well groomed [General Appearance - Well Nourished] : well nourished [No Deformities] : no deformities [General Appearance - In No Acute Distress] : no acute distress [Normal Conjunctiva] : the conjunctiva exhibited no abnormalities [Eyelids - No Xanthelasma] : the eyelids demonstrated no xanthelasmas [Normal Oral Mucosa] : normal oral mucosa [No Oral Pallor] : no oral pallor [No Oral Cyanosis] : no oral cyanosis [Normal Jugular Venous A Waves Present] : normal jugular venous A waves present [Normal Jugular Venous V Waves Present] : normal jugular venous V waves present [No Jugular Venous Cain A Waves] : no jugular venous cain A waves [Respiration, Rhythm And Depth] : normal respiratory rhythm and effort [Exaggerated Use Of Accessory Muscles For Inspiration] : no accessory muscle use [Auscultation Breath Sounds / Voice Sounds] : lungs were clear to auscultation bilaterally [Heart Rate And Rhythm] : heart rate and rhythm were normal [Heart Sounds] : normal S1 and S2 [Murmurs] : no murmurs present [Abdomen Soft] : soft [Abdomen Tenderness] : non-tender [Abdomen Mass (___ Cm)] : no abdominal mass palpated [Abnormal Walk] : normal gait [Gait - Sufficient For Exercise Testing] : the gait was sufficient for exercise testing [Nail Clubbing] : no clubbing of the fingernails [Cyanosis, Localized] : no localized cyanosis [Petechial Hemorrhages (___cm)] : no petechial hemorrhages [Skin Color & Pigmentation] : normal skin color and pigmentation [] : no rash [No Venous Stasis] : no venous stasis [Skin Lesions] : no skin lesions [No Skin Ulcers] : no skin ulcer [No Xanthoma] : no  xanthoma was observed [Oriented To Time, Place, And Person] : oriented to person, place, and time [Affect] : the affect was normal [Mood] : the mood was normal [No Anxiety] : not feeling anxious [FreeTextEntry1] : Poor distal LE pulses

## 2018-12-26 NOTE — REASON FOR VISIT
[Coronary Artery Disease] : coronary artery disease [Hyperlipidemia] : hyperlipidemia [Medication Management] : Medication management [Prior Myocardial Infarction] : a prior myocardial infarction [Follow-Up - Clinic] : a clinic follow-up of

## 2019-02-15 ENCOUNTER — MEDICATION RENEWAL (OUTPATIENT)
Age: 74
End: 2019-02-15

## 2019-06-20 ENCOUNTER — NON-APPOINTMENT (OUTPATIENT)
Age: 74
End: 2019-06-20

## 2019-06-20 ENCOUNTER — APPOINTMENT (OUTPATIENT)
Dept: CARDIOLOGY | Facility: CLINIC | Age: 74
End: 2019-06-20
Payer: MEDICARE

## 2019-06-20 VITALS
HEART RATE: 51 BPM | BODY MASS INDEX: 27.66 KG/M2 | DIASTOLIC BLOOD PRESSURE: 73 MMHG | SYSTOLIC BLOOD PRESSURE: 116 MMHG | OXYGEN SATURATION: 97 % | WEIGHT: 166 LBS | HEIGHT: 65 IN

## 2019-06-20 PROCEDURE — 93000 ELECTROCARDIOGRAM COMPLETE: CPT

## 2019-06-20 PROCEDURE — 99214 OFFICE O/P EST MOD 30 MIN: CPT

## 2019-06-20 NOTE — REASON FOR VISIT
[Follow-Up - Clinic] : a clinic follow-up of [Coronary Artery Disease] : coronary artery disease [Medication Management] : Medication management [Hyperlipidemia] : hyperlipidemia [Prior Myocardial Infarction] : a prior myocardial infarction

## 2019-06-23 NOTE — PHYSICAL EXAM
[General Appearance - Well Developed] : well developed [Normal Appearance] : normal appearance [Well Groomed] : well groomed [General Appearance - Well Nourished] : well nourished [No Deformities] : no deformities [General Appearance - In No Acute Distress] : no acute distress [Eyelids - No Xanthelasma] : the eyelids demonstrated no xanthelasmas [Normal Conjunctiva] : the conjunctiva exhibited no abnormalities [Normal Oral Mucosa] : normal oral mucosa [No Oral Pallor] : no oral pallor [No Oral Cyanosis] : no oral cyanosis [Normal Jugular Venous V Waves Present] : normal jugular venous V waves present [Normal Jugular Venous A Waves Present] : normal jugular venous A waves present [No Jugular Venous Cain A Waves] : no jugular venous cain A waves [Respiration, Rhythm And Depth] : normal respiratory rhythm and effort [Exaggerated Use Of Accessory Muscles For Inspiration] : no accessory muscle use [Auscultation Breath Sounds / Voice Sounds] : lungs were clear to auscultation bilaterally [Heart Rate And Rhythm] : heart rate and rhythm were normal [Heart Sounds] : normal S1 and S2 [Murmurs] : no murmurs present [Abdomen Soft] : soft [Abdomen Tenderness] : non-tender [Abdomen Mass (___ Cm)] : no abdominal mass palpated [Gait - Sufficient For Exercise Testing] : the gait was sufficient for exercise testing [Abnormal Walk] : normal gait [Nail Clubbing] : no clubbing of the fingernails [Cyanosis, Localized] : no localized cyanosis [Petechial Hemorrhages (___cm)] : no petechial hemorrhages [Skin Color & Pigmentation] : normal skin color and pigmentation [] : no rash [Skin Lesions] : no skin lesions [No Venous Stasis] : no venous stasis [No Xanthoma] : no  xanthoma was observed [No Skin Ulcers] : no skin ulcer [Oriented To Time, Place, And Person] : oriented to person, place, and time [Affect] : the affect was normal [Mood] : the mood was normal [No Anxiety] : not feeling anxious [FreeTextEntry1] : Poor distal LE pulses

## 2019-09-05 ENCOUNTER — NON-APPOINTMENT (OUTPATIENT)
Age: 74
End: 2019-09-05

## 2019-09-05 ENCOUNTER — APPOINTMENT (OUTPATIENT)
Dept: CARDIOLOGY | Facility: CLINIC | Age: 74
End: 2019-09-05
Payer: MEDICARE

## 2019-09-05 VITALS
SYSTOLIC BLOOD PRESSURE: 131 MMHG | HEIGHT: 65 IN | OXYGEN SATURATION: 98 % | DIASTOLIC BLOOD PRESSURE: 77 MMHG | HEART RATE: 50 BPM

## 2019-09-05 PROCEDURE — 99213 OFFICE O/P EST LOW 20 MIN: CPT

## 2019-09-05 PROCEDURE — 93000 ELECTROCARDIOGRAM COMPLETE: CPT

## 2019-09-05 NOTE — REASON FOR VISIT
[Follow-Up - Clinic] : a clinic follow-up of [Coronary Artery Disease] : coronary artery disease [Hyperlipidemia] : hyperlipidemia [Medication Management] : Medication management [Prior Myocardial Infarction] : a prior myocardial infarction

## 2019-09-16 NOTE — PHYSICAL EXAM
[General Appearance - Well Developed] : well developed [Normal Appearance] : normal appearance [Well Groomed] : well groomed [General Appearance - Well Nourished] : well nourished [No Deformities] : no deformities [General Appearance - In No Acute Distress] : no acute distress [Normal Conjunctiva] : the conjunctiva exhibited no abnormalities [Eyelids - No Xanthelasma] : the eyelids demonstrated no xanthelasmas [Normal Oral Mucosa] : normal oral mucosa [No Oral Pallor] : no oral pallor [No Oral Cyanosis] : no oral cyanosis [Normal Jugular Venous A Waves Present] : normal jugular venous A waves present [Normal Jugular Venous V Waves Present] : normal jugular venous V waves present [No Jugular Venous Cain A Waves] : no jugular venous cain A waves [Respiration, Rhythm And Depth] : normal respiratory rhythm and effort [Exaggerated Use Of Accessory Muscles For Inspiration] : no accessory muscle use [Auscultation Breath Sounds / Voice Sounds] : lungs were clear to auscultation bilaterally [Heart Rate And Rhythm] : heart rate and rhythm were normal [Murmurs] : no murmurs present [Heart Sounds] : normal S1 and S2 [Abdomen Soft] : soft [Abdomen Tenderness] : non-tender [Abnormal Walk] : normal gait [Abdomen Mass (___ Cm)] : no abdominal mass palpated [Gait - Sufficient For Exercise Testing] : the gait was sufficient for exercise testing [Nail Clubbing] : no clubbing of the fingernails [Petechial Hemorrhages (___cm)] : no petechial hemorrhages [Cyanosis, Localized] : no localized cyanosis [Skin Color & Pigmentation] : normal skin color and pigmentation [] : no rash [No Venous Stasis] : no venous stasis [Skin Lesions] : no skin lesions [No Skin Ulcers] : no skin ulcer [No Xanthoma] : no  xanthoma was observed [Oriented To Time, Place, And Person] : oriented to person, place, and time [Affect] : the affect was normal [No Anxiety] : not feeling anxious [Mood] : the mood was normal [FreeTextEntry1] : Poor distal LE pulses

## 2020-03-05 ENCOUNTER — APPOINTMENT (OUTPATIENT)
Dept: CARDIOLOGY | Facility: CLINIC | Age: 75
End: 2020-03-05
Payer: MEDICARE

## 2020-03-05 ENCOUNTER — NON-APPOINTMENT (OUTPATIENT)
Age: 75
End: 2020-03-05

## 2020-03-05 VITALS
WEIGHT: 165 LBS | OXYGEN SATURATION: 96 % | HEART RATE: 47 BPM | SYSTOLIC BLOOD PRESSURE: 116 MMHG | BODY MASS INDEX: 27.46 KG/M2 | DIASTOLIC BLOOD PRESSURE: 68 MMHG

## 2020-03-05 PROCEDURE — 93000 ELECTROCARDIOGRAM COMPLETE: CPT

## 2020-03-05 PROCEDURE — 99213 OFFICE O/P EST LOW 20 MIN: CPT

## 2020-03-08 NOTE — PHYSICAL EXAM
[General Appearance - Well Developed] : well developed [Normal Appearance] : normal appearance [Well Groomed] : well groomed [General Appearance - Well Nourished] : well nourished [No Deformities] : no deformities [General Appearance - In No Acute Distress] : no acute distress [Normal Conjunctiva] : the conjunctiva exhibited no abnormalities [Eyelids - No Xanthelasma] : the eyelids demonstrated no xanthelasmas [Normal Oral Mucosa] : normal oral mucosa [No Oral Pallor] : no oral pallor [No Oral Cyanosis] : no oral cyanosis [Normal Jugular Venous A Waves Present] : normal jugular venous A waves present [Normal Jugular Venous V Waves Present] : normal jugular venous V waves present [No Jugular Venous Cani A Waves] : no jugular venous cain A waves [Respiration, Rhythm And Depth] : normal respiratory rhythm and effort [Exaggerated Use Of Accessory Muscles For Inspiration] : no accessory muscle use [Auscultation Breath Sounds / Voice Sounds] : lungs were clear to auscultation bilaterally [Heart Rate And Rhythm] : heart rate and rhythm were normal [Heart Sounds] : normal S1 and S2 [Murmurs] : no murmurs present [Abdomen Soft] : soft [Abdomen Tenderness] : non-tender [Abdomen Mass (___ Cm)] : no abdominal mass palpated [Abnormal Walk] : normal gait [Gait - Sufficient For Exercise Testing] : the gait was sufficient for exercise testing [Nail Clubbing] : no clubbing of the fingernails [Cyanosis, Localized] : no localized cyanosis [Petechial Hemorrhages (___cm)] : no petechial hemorrhages [Skin Color & Pigmentation] : normal skin color and pigmentation [] : no rash [No Venous Stasis] : no venous stasis [Skin Lesions] : no skin lesions [No Skin Ulcers] : no skin ulcer [No Xanthoma] : no  xanthoma was observed [Oriented To Time, Place, And Person] : oriented to person, place, and time [Affect] : the affect was normal [Mood] : the mood was normal [No Anxiety] : not feeling anxious [FreeTextEntry1] : Poor distal LE pulses

## 2020-09-17 ENCOUNTER — NON-APPOINTMENT (OUTPATIENT)
Age: 75
End: 2020-09-17

## 2020-09-17 ENCOUNTER — APPOINTMENT (OUTPATIENT)
Dept: CARDIOLOGY | Facility: CLINIC | Age: 75
End: 2020-09-17
Payer: MEDICARE

## 2020-09-17 VITALS — HEART RATE: 56 BPM | SYSTOLIC BLOOD PRESSURE: 132 MMHG | OXYGEN SATURATION: 98 % | DIASTOLIC BLOOD PRESSURE: 68 MMHG

## 2020-09-17 PROCEDURE — 99213 OFFICE O/P EST LOW 20 MIN: CPT

## 2020-09-17 PROCEDURE — 93000 ELECTROCARDIOGRAM COMPLETE: CPT

## 2020-09-19 NOTE — REVIEW OF SYSTEMS
[Feeling Fatigued] : feeling fatigued [Negative] : Heme/Lymph [Dyspnea on exertion] : not dyspnea during exertion

## 2021-02-16 NOTE — ED PROVIDER NOTE - NS ED MD DISPO ISOLATION TYPES
-- DO NOT REPLY / DO NOT REPLY ALL --  -- Message is from the [facility]--    COVID-19 Universal Screening: N/A - Not about scheduling    General Patient Message      Reason for Call: Patient is requesting if she can have an ultrasound for her stomach done tomorrow at her appointment - she was recently at [facility] because of stomach issues - please call patient back to further advise    Caller Information       Type Contact Phone    02/16/2021 10:43 AM CST Phone (Incoming) Last [facility]cer (Self) [phone] (M)          Alternative phone number: none    Turnaround time given to caller:   \"This message will be sent to [state Provider's name]. The clinical team will fulfill your request as soon as they review your message.\"    
Wait/recall list notification, pt is due for 1 year repeat EGD, with  Dr. Kaur, after 8-21-18.  DX:    Hx Martinez's esophagus    Spoke to pt . He is now scheduled for 9-4-18 at [facility], Dr. Kaur, MAC sedation (per waitlist +pr reports difficulty waking up after EGD 2017).  See orders only encounter for details.  
None

## 2021-09-22 ENCOUNTER — RESULT CHARGE (OUTPATIENT)
Age: 76
End: 2021-09-22

## 2021-09-23 ENCOUNTER — APPOINTMENT (OUTPATIENT)
Dept: CARDIOLOGY | Facility: CLINIC | Age: 76
End: 2021-09-23
Payer: MEDICARE

## 2021-09-23 ENCOUNTER — NON-APPOINTMENT (OUTPATIENT)
Age: 76
End: 2021-09-23

## 2021-09-23 VITALS
OXYGEN SATURATION: 98 % | HEIGHT: 65 IN | WEIGHT: 167 LBS | DIASTOLIC BLOOD PRESSURE: 72 MMHG | HEART RATE: 52 BPM | SYSTOLIC BLOOD PRESSURE: 116 MMHG | BODY MASS INDEX: 27.82 KG/M2

## 2021-09-23 PROCEDURE — 93000 ELECTROCARDIOGRAM COMPLETE: CPT

## 2021-09-23 PROCEDURE — 99214 OFFICE O/P EST MOD 30 MIN: CPT

## 2021-10-14 ENCOUNTER — OUTPATIENT (OUTPATIENT)
Dept: OUTPATIENT SERVICES | Facility: HOSPITAL | Age: 76
LOS: 1 days | End: 2021-10-14
Payer: MEDICARE

## 2021-10-14 ENCOUNTER — APPOINTMENT (OUTPATIENT)
Dept: CV DIAGNOSTICS | Facility: HOSPITAL | Age: 76
End: 2021-10-14

## 2021-10-14 DIAGNOSIS — Z98.890 OTHER SPECIFIED POSTPROCEDURAL STATES: Chronic | ICD-10-CM

## 2021-10-14 DIAGNOSIS — I25.10 ATHEROSCLEROTIC HEART DISEASE OF NATIVE CORONARY ARTERY WITHOUT ANGINA PECTORIS: ICD-10-CM

## 2021-10-14 PROCEDURE — 93018 CV STRESS TEST I&R ONLY: CPT

## 2021-10-14 PROCEDURE — 93017 CV STRESS TEST TRACING ONLY: CPT

## 2021-10-14 PROCEDURE — 78452 HT MUSCLE IMAGE SPECT MULT: CPT | Mod: 26,MH

## 2021-10-14 PROCEDURE — 78452 HT MUSCLE IMAGE SPECT MULT: CPT | Mod: MH

## 2021-10-14 PROCEDURE — 93016 CV STRESS TEST SUPVJ ONLY: CPT

## 2021-10-14 PROCEDURE — A9500: CPT

## 2021-10-17 NOTE — CARDIOLOGY SUMMARY
[de-identified] : 9/23/21\par Sinus  Bradycardia \par -Right bundle branch block. \par \par ABNORMAL \par

## 2022-01-28 ENCOUNTER — APPOINTMENT (OUTPATIENT)
Dept: UROLOGY | Facility: CLINIC | Age: 77
End: 2022-01-28
Payer: MEDICARE

## 2022-01-28 VITALS — TEMPERATURE: 92.5 F

## 2022-01-28 DIAGNOSIS — N13.8 BENIGN PROSTATIC HYPERPLASIA WITH LOWER URINARY TRACT SYMPMS: ICD-10-CM

## 2022-01-28 DIAGNOSIS — R97.20 ELEVATED PROSTATE, SPECIFIC ANTIGEN [PSA]: ICD-10-CM

## 2022-01-28 DIAGNOSIS — N40.1 BENIGN PROSTATIC HYPERPLASIA WITH LOWER URINARY TRACT SYMPMS: ICD-10-CM

## 2022-01-28 PROCEDURE — 99204 OFFICE O/P NEW MOD 45 MIN: CPT

## 2022-01-28 NOTE — HISTORY OF PRESENT ILLNESS
[FreeTextEntry1] : Very pleasant 76-year-old gentleman who presents for evaluation of elevated PSA found on recent blood test to 4.0.  Reports that he has never been told about an elevated PSA in the past.  PSA last year was 2.5. No suprapubic pain. No dysuria.  No increased urinary frequency or urgency.  Nocturia x0. No history of urinary tract infections or renal impairment. No gross hematuria.  No aggravating or alleviating factors. No history of urinary tract instrumentation in the past.\par \par No family history of  malignancy, including prostate cancer.  No family history of nephrolithiasis.

## 2022-01-28 NOTE — ASSESSMENT
[FreeTextEntry1] : Very pleasant 76-year-old-gentleman who presents for evaluation of elevated PSA, BPH\par -We discussed the potential etiologies of an elevated PSA, including but not limited to prostate cancer, urinary tract infections, prostatitis, BPH, and recent ejaculation.\par -urinalysis\par -urine culture\par -Repeat PSA today\par -We discussed the normal age-adjusted PSA ranges that his PSA is within the normal range\par -Given rise in PSA over time, if his PSA continues to rise we will do a prostate MRI\par -We discussed the differences between prostate MRI and a prostate biopsy for evaluation for prostate cancer.  We discussed the risks and benefits of both a prostate biopsy versus a prostate MRI, including the limitations of both.  We discussed the benefit of obtaining an MRI prior to a prostate biopsy with the advantage of being able to do a transperineal fusion biopsy after MRI.  After thorough discussion of the risks and benefits of each he would like to proceed with a prostate MRI in anticipation of a fusion biopsy if PSA remains elevated\par

## 2022-01-31 LAB
APPEARANCE: ABNORMAL
BACTERIA UR CULT: NORMAL
BACTERIA: NEGATIVE
BILIRUBIN URINE: NEGATIVE
BLOOD URINE: NEGATIVE
COLOR: NORMAL
GLUCOSE QUALITATIVE U: NEGATIVE
HYALINE CASTS: 1 /LPF
KETONES URINE: NEGATIVE
LEUKOCYTE ESTERASE URINE: NEGATIVE
MICROSCOPIC-UA: NORMAL
NITRITE URINE: NEGATIVE
PH URINE: 6
PROTEIN URINE: NEGATIVE
PSA SERPL-MCNC: 3.62 NG/ML
RED BLOOD CELLS URINE: 2 /HPF
SPECIFIC GRAVITY URINE: 1.02
SQUAMOUS EPITHELIAL CELLS: 0 /HPF
UROBILINOGEN URINE: NORMAL
WHITE BLOOD CELLS URINE: 0 /HPF

## 2022-05-16 NOTE — CHART NOTE - NSCHARTNOTEFT_GEN_A_CORE
Patient troponin elevated significantly, seen and examined at bedside, reports chest pain on left arm, not in any distress, ECG with new T wave inversions in lead 3 only, Dr Guadarrama informed, patient is for transfer in am to [place] under Dr Rao, transfer papers, dc complete.   Spoke with daughter Krysta Yee over phone, [phone], she gave consent for transfer, she requested to be updated in case of change of status of patient. Patient troponin elevated significantly, seen and examined at bedside, reports chest pain on left arm, not in any distress, ECG with new T wave inversions in lead 3 only, Dr Guadarrama informed, patient is for transfer in am to [place] under Dr Rao, transfer papers, dc complete.   Spoke with daughter Krysta Yee over phone, [phone], she gave consent for transfer, she requested to be updated in case of change of status of patient.  patient and wife was explained via  ID [id number] Patient troponin elevated significantly, seen and examined at bedside, reports chest pain on left arm, not in any distress, ECG with new T wave inversions in lead 3 only, Dr Guadarrama informed, patient is for transfer in am to [place] under Dr Astudillo, transfer papers, dc complete.   Spoke with daughter Krysta Yee over phone, [phone], she gave consent for transfer, she requested to be updated in case of change of status of patient.  patient and wife was explained via  ID [id number]    Attending Addendum:  Informed by PGY2 that second troponin significantly elevated to 11. No active chest pain. EKG repeated no ST elevation.  Patient with NSTEMI, discussed with Dr. Guadarrama. Heparin drip started. Continue ASA  d/w Dr. Guadarrama, hold off Plavix loading in the event patient needs Surgical intervention.   Family was informed by House staff Patient

## 2022-07-29 ENCOUNTER — APPOINTMENT (OUTPATIENT)
Dept: UROLOGY | Facility: CLINIC | Age: 77
End: 2022-07-29

## 2022-09-22 ENCOUNTER — NON-APPOINTMENT (OUTPATIENT)
Age: 77
End: 2022-09-22

## 2022-09-22 ENCOUNTER — APPOINTMENT (OUTPATIENT)
Dept: CARDIOLOGY | Facility: CLINIC | Age: 77
End: 2022-09-22

## 2022-09-22 VITALS
HEART RATE: 54 BPM | DIASTOLIC BLOOD PRESSURE: 76 MMHG | SYSTOLIC BLOOD PRESSURE: 125 MMHG | HEIGHT: 65 IN | OXYGEN SATURATION: 96 % | WEIGHT: 167 LBS | BODY MASS INDEX: 27.82 KG/M2

## 2022-09-22 PROCEDURE — 99214 OFFICE O/P EST MOD 30 MIN: CPT | Mod: 25

## 2022-09-22 PROCEDURE — 93000 ELECTROCARDIOGRAM COMPLETE: CPT

## 2022-09-23 NOTE — HISTORY OF PRESENT ILLNESS
[FreeTextEntry1] : returning for f/u\par No CP\par No NAJERA\par No LE edema\par No palpitations\par No syncope or blackouts\par Does not exercise or go for walks - is very sedentary

## 2022-09-23 NOTE — DISCUSSION/SUMMARY
[FreeTextEntry1] : Doing Well\par Encouraged more walking\par Labs with PCP\par No change in meds -\par \par Time spent reviewing last years stress test [EKG obtained to assist in diagnosis and management of assessed problem(s)] : EKG obtained to assist in diagnosis and management of assessed problem(s)

## 2022-09-23 NOTE — CARDIOLOGY SUMMARY
[de-identified] : 9/22/22\par Marked sinus  Bradycardia \par -Old inferior infarct  -Right bundle branch block and unusual T-axis -possible true posterior extension of inferior MI. \par \par ABNORMAL \par

## 2023-05-24 ENCOUNTER — NON-APPOINTMENT (OUTPATIENT)
Age: 78
End: 2023-05-24

## 2023-05-24 ENCOUNTER — APPOINTMENT (OUTPATIENT)
Dept: CARDIOLOGY | Facility: CLINIC | Age: 78
End: 2023-05-24
Payer: MEDICARE

## 2023-05-24 VITALS
WEIGHT: 182 LBS | HEART RATE: 48 BPM | SYSTOLIC BLOOD PRESSURE: 128 MMHG | DIASTOLIC BLOOD PRESSURE: 75 MMHG | HEIGHT: 65 IN | OXYGEN SATURATION: 99 % | BODY MASS INDEX: 30.32 KG/M2

## 2023-05-24 PROCEDURE — 99214 OFFICE O/P EST MOD 30 MIN: CPT | Mod: 25

## 2023-05-24 PROCEDURE — 93000 ELECTROCARDIOGRAM COMPLETE: CPT

## 2023-05-26 NOTE — CARDIOLOGY SUMMARY
[de-identified] : 5/24/23\par Sinus  Bradycardia \par -Right bundle branch block. \par  -  Nonspecific T-abnormality. \par \par ABNORMAL \par

## 2023-05-26 NOTE — DISCUSSION/SUMMARY
[FreeTextEntry1] : Doing Well\par Encouraged more walking - spent added time discussing this with him\par Labs with PCP\par No change in meds -  [EKG obtained to assist in diagnosis and management of assessed problem(s)] : EKG obtained to assist in diagnosis and management of assessed problem(s)

## 2023-06-21 ENCOUNTER — OFFICE (OUTPATIENT)
Dept: URBAN - METROPOLITAN AREA CLINIC 35 | Facility: CLINIC | Age: 78
Setting detail: OPHTHALMOLOGY
End: 2023-06-21
Payer: MEDICARE

## 2023-06-21 DIAGNOSIS — H25.012: ICD-10-CM

## 2023-06-21 DIAGNOSIS — H16.223: ICD-10-CM

## 2023-06-21 DIAGNOSIS — H52.7: ICD-10-CM

## 2023-06-21 DIAGNOSIS — H25.13: ICD-10-CM

## 2023-06-21 DIAGNOSIS — H43.23: ICD-10-CM

## 2023-06-21 DIAGNOSIS — H52.03: ICD-10-CM

## 2023-06-21 PROCEDURE — 99213 OFFICE O/P EST LOW 20 MIN: CPT | Performed by: OPHTHALMOLOGY

## 2023-06-21 PROCEDURE — 92015 DETERMINE REFRACTIVE STATE: CPT | Performed by: OPHTHALMOLOGY

## 2023-06-21 ASSESSMENT — REFRACTION_AUTOREFRACTION
OD_SPHERE: +1.75
OS_CYLINDER: +0.50
OS_AXIS: 075
OD_AXIS: 050
OD_CYLINDER: +0.75
OS_SPHERE: +1.25

## 2023-06-21 ASSESSMENT — VISUAL ACUITY
OS_BCVA: 20/60-2
OD_BCVA: 20/25-1

## 2023-06-21 ASSESSMENT — KERATOMETRY
OS_AXISANGLE_DEGREES: 090
OS_K1POWER_DIOPTERS: 44.25
OD_AXISANGLE_DEGREES: 060
OS_K2POWER_DIOPTERS: 44.50
OD_K1POWER_DIOPTERS: 43.75
OD_K2POWER_DIOPTERS: 44.00

## 2023-06-21 ASSESSMENT — REFRACTION_CURRENTRX
OS_CYLINDER: SPH
OD_SPHERE: +4.25
OD_CYLINDER: SPH
OD_OVR_VA: 20/
OS_VPRISM_DIRECTION: SV
OS_CYLINDER: SPH
OD_CYLINDER: SPH
OS_OVR_VA: 20/
OS_OVR_VA: 20/
OD_OVR_VA: 20/
OS_VPRISM_DIRECTION: SV
OS_SPHERE: +4.00
OD_SPHERE: +1.25
OD_VPRISM_DIRECTION: SV
OS_SPHERE: +1.00
OD_VPRISM_DIRECTION: SV

## 2023-06-21 ASSESSMENT — SUPERFICIAL PUNCTATE KERATITIS (SPK)
OD_SPK: 1+
OS_SPK: T 1+

## 2023-06-21 ASSESSMENT — DRY EYES - PHYSICIAN NOTES
OD_GENERALCOMMENTS: INF
OS_GENERALCOMMENTS: INF

## 2023-06-21 ASSESSMENT — REFRACTION_MANIFEST
OS_SPHERE: +1.00
OS_SPHERE: +4.50
OD_AXIS: 070
OD_SPHERE: +1.75
OD_CYLINDER: +0.25
OD_VA1: 20/30-
OS_CYLINDER: SPHERE
OS_AXIS: 115
OS_VA1: 20/20-
OD_SPHERE: +4.50
OS_CYLINDER: +0.50
OD_CYLINDER: SPHERE

## 2023-06-21 ASSESSMENT — SPHEQUIV_DERIVED
OS_SPHEQUIV: 1.5
OD_SPHEQUIV: 2.125
OD_SPHEQUIV: 1.875
OS_SPHEQUIV: 1.25

## 2023-06-21 ASSESSMENT — TONOMETRY
OD_IOP_MMHG: 17
OS_IOP_MMHG: 17

## 2023-06-21 ASSESSMENT — CONFRONTATIONAL VISUAL FIELD TEST (CVF)
OD_FINDINGS: FULL
OS_FINDINGS: FULL

## 2023-06-21 ASSESSMENT — AXIALLENGTH_DERIVED
OD_AL: 22.6642
OD_AL: 22.7545
OS_AL: 22.7196
OS_AL: 22.8103

## 2023-06-21 ASSESSMENT — CORNEAL SURGICAL SCARRING: OD_SCARRING: CENTRAL ANTERIOR

## 2023-11-01 NOTE — PROVIDER CONTACT NOTE (CRITICAL VALUE NOTIFICATION) - PERSON GIVING RESULT:
Airway       Patient location during procedure: OR       Procedure Start/Stop Times: 11/1/2023 10:24 AM  Staff -        CRNA: Nicholas Ritter APRN CRNA       Performed By: CRNA  Consent for Airway        Urgency: elective  Indications and Patient Condition       Indications for airway management: carlos-procedural       Induction type:intravenous       Mask difficulty assessment: 0 - not attempted    Final Airway Details       Final airway type: endotracheal airway       Successful airway: ETT - single  Endotracheal Airway Details        ETT size (mm): 7.0       Cuffed: yes       Successful intubation technique: video laryngoscopy       VL Blade Size: Glidescope 3       Grade View of Cords: 1       Adjucts: stylet       Bite block used: None    Post intubation assessment        Placement verified by: capnometry, equal breath sounds and chest rise        Number of attempts at approach: 2       Secured with: silk tape       Ease of procedure: easy       Dentition: Intact and Unchanged    Medication(s) Administered   Medication Administration Time: 11/1/2023 10:24 AM        
Bed: FT02  Expected date:   Expected time:   Means of arrival:   Comments:
Patient provided with water in waiting room per [facility] request
Lab
lab

## 2023-11-21 ENCOUNTER — APPOINTMENT (OUTPATIENT)
Dept: CARDIOLOGY | Facility: CLINIC | Age: 78
End: 2023-11-21
Payer: MEDICARE

## 2023-11-21 VITALS
WEIGHT: 162 LBS | BODY MASS INDEX: 26.99 KG/M2 | OXYGEN SATURATION: 96 % | HEART RATE: 53 BPM | DIASTOLIC BLOOD PRESSURE: 74 MMHG | HEIGHT: 65 IN | SYSTOLIC BLOOD PRESSURE: 118 MMHG

## 2023-11-21 PROCEDURE — 93000 ELECTROCARDIOGRAM COMPLETE: CPT

## 2023-11-21 PROCEDURE — 99214 OFFICE O/P EST MOD 30 MIN: CPT | Mod: 25

## 2024-03-11 RX ORDER — LOVASTATIN 20 MG
1 TABLET ORAL
Qty: 0 | Refills: 0 | DISCHARGE

## 2024-03-11 RX ORDER — LOVASTATIN 20 MG
0 TABLET ORAL
Qty: 0 | Refills: 0 | DISCHARGE

## 2024-03-11 RX ORDER — ASPIRIN/CALCIUM CARB/MAGNESIUM 324 MG
1 TABLET ORAL
Qty: 0 | Refills: 0 | DISCHARGE

## 2024-03-11 RX ORDER — ASPIRIN/CALCIUM CARB/MAGNESIUM 324 MG
0 TABLET ORAL
Qty: 0 | Refills: 0 | DISCHARGE

## 2024-03-11 RX ORDER — CHOLECALCIFEROL (VITAMIN D3) 125 MCG
1 CAPSULE ORAL
Qty: 0 | Refills: 0 | DISCHARGE

## 2024-04-05 NOTE — H&P ADULT - ASSESSMENT
What Is The Reason For Today's Visit?: History of Non-Melanoma Skin Cancer
How Many Skin Cancers Have You Had?: one
When Was Your Last Cancer Diagnosed?: 2013
Pt is a 72 yr old male with pmh of htn & hld who came in to emergency with chest pain x 1 hours.   In ED pts vitals were stable, first set of cardiac enzymes was negative with EKG showing NSR 70BMP, no ST segment & T wave changes. EKG does show RBBB with no comparison available

## 2024-04-10 PROBLEM — I10 ESSENTIAL (PRIMARY) HYPERTENSION: Chronic | Status: ACTIVE | Noted: 2018-09-03

## 2024-04-10 PROBLEM — E78.5 HYPERLIPIDEMIA, UNSPECIFIED: Chronic | Status: ACTIVE | Noted: 2018-09-03

## 2024-04-23 ENCOUNTER — APPOINTMENT (OUTPATIENT)
Dept: CARDIOLOGY | Facility: CLINIC | Age: 79
End: 2024-04-23
Payer: MEDICARE

## 2024-04-23 ENCOUNTER — NON-APPOINTMENT (OUTPATIENT)
Age: 79
End: 2024-04-23

## 2024-04-23 VITALS
BODY MASS INDEX: 27.16 KG/M2 | WEIGHT: 163 LBS | SYSTOLIC BLOOD PRESSURE: 125 MMHG | OXYGEN SATURATION: 97 % | HEIGHT: 65 IN | HEART RATE: 48 BPM | DIASTOLIC BLOOD PRESSURE: 72 MMHG

## 2024-04-23 DIAGNOSIS — I25.10 ATHEROSCLEROTIC HEART DISEASE OF NATIVE CORONARY ARTERY W/OUT ANGINA PECTORIS: ICD-10-CM

## 2024-04-23 DIAGNOSIS — I10 ESSENTIAL (PRIMARY) HYPERTENSION: ICD-10-CM

## 2024-04-23 DIAGNOSIS — E78.5 HYPERLIPIDEMIA, UNSPECIFIED: ICD-10-CM

## 2024-04-23 PROCEDURE — 93000 ELECTROCARDIOGRAM COMPLETE: CPT

## 2024-04-23 PROCEDURE — 99214 OFFICE O/P EST MOD 30 MIN: CPT | Mod: 25

## 2024-04-23 RX ORDER — CLOPIDOGREL BISULFATE 75 MG/1
75 TABLET, FILM COATED ORAL DAILY
Qty: 90 | Refills: 3 | Status: DISCONTINUED | COMMUNITY
Start: 1900-01-01 | End: 2024-04-23

## 2024-04-23 RX ORDER — ENALAPRIL MALEATE 10 MG/1
10 TABLET ORAL
Qty: 90 | Refills: 3 | Status: ACTIVE | COMMUNITY
Start: 1900-01-01 | End: 1900-01-01

## 2024-04-23 RX ORDER — LOVASTATIN 20 MG/1
20 TABLET ORAL
Qty: 90 | Refills: 3 | Status: ACTIVE | COMMUNITY
Start: 1900-01-01 | End: 1900-01-01

## 2024-04-23 RX ORDER — METOPROLOL TARTRATE 25 MG/1
25 TABLET, FILM COATED ORAL TWICE DAILY
Qty: 90 | Refills: 3 | Status: ACTIVE | COMMUNITY
Start: 1900-01-01 | End: 1900-01-01

## 2024-04-24 PROBLEM — I10 HYPERTENSION: Status: ACTIVE | Noted: 2018-09-12

## 2024-04-24 PROBLEM — I25.10 CAD (CORONARY ARTERY DISEASE): Status: ACTIVE | Noted: 2018-09-12

## 2024-04-24 PROBLEM — E78.5 HYPERLIPIDEMIA: Status: ACTIVE | Noted: 2018-09-12

## 2024-04-24 NOTE — HISTORY OF PRESENT ILLNESS
[FreeTextEntry1] : returning for f/u No CP No NAJERA No LE edema No palpitations No syncope or blackouts

## 2024-04-24 NOTE — DISCUSSION/SUMMARY
[FreeTextEntry1] : Doing Well Encouraged more walking  Labs with PCP No change in meds -  [EKG obtained to assist in diagnosis and management of assessed problem(s)] : EKG obtained to assist in diagnosis and management of assessed problem(s)

## 2024-06-07 ENCOUNTER — APPOINTMENT (OUTPATIENT)
Dept: CV DIAGNOSTICS | Facility: HOSPITAL | Age: 79
End: 2024-06-07

## 2024-07-23 ENCOUNTER — APPOINTMENT (OUTPATIENT)
Dept: CV DIAGNOSITCS | Facility: HOSPITAL | Age: 79
End: 2024-07-23

## 2025-07-18 ENCOUNTER — APPOINTMENT (OUTPATIENT)
Dept: UROLOGY | Facility: CLINIC | Age: 80
End: 2025-07-18
Payer: MEDICARE

## 2025-07-18 VITALS
BODY MASS INDEX: 26.66 KG/M2 | DIASTOLIC BLOOD PRESSURE: 79 MMHG | HEIGHT: 65 IN | TEMPERATURE: 96.8 F | WEIGHT: 160 LBS | OXYGEN SATURATION: 97 % | HEART RATE: 65 BPM | SYSTOLIC BLOOD PRESSURE: 144 MMHG

## 2025-07-18 PROCEDURE — G2211 COMPLEX E/M VISIT ADD ON: CPT

## 2025-07-18 PROCEDURE — 99204 OFFICE O/P NEW MOD 45 MIN: CPT

## 2025-08-15 ENCOUNTER — APPOINTMENT (OUTPATIENT)
Dept: UROLOGY | Facility: CLINIC | Age: 80
End: 2025-08-15

## 2025-08-25 ENCOUNTER — RESULT REVIEW (OUTPATIENT)
Age: 80
End: 2025-08-25

## 2025-08-25 ENCOUNTER — APPOINTMENT (OUTPATIENT)
Dept: MRI IMAGING | Facility: CLINIC | Age: 80
End: 2025-08-25
Payer: MEDICARE

## 2025-08-25 PROCEDURE — 76498P: CUSTOM

## 2025-08-25 PROCEDURE — A9585: CPT | Mod: JZ

## 2025-08-25 PROCEDURE — 72197 MRI PELVIS W/O & W/DYE: CPT

## 2025-09-02 ENCOUNTER — APPOINTMENT (OUTPATIENT)
Dept: CARDIOLOGY | Facility: CLINIC | Age: 80
End: 2025-09-02

## 2025-09-03 ENCOUNTER — RESULT REVIEW (OUTPATIENT)
Age: 80
End: 2025-09-03